# Patient Record
Sex: FEMALE | Race: WHITE | NOT HISPANIC OR LATINO | Employment: FULL TIME | URBAN - METROPOLITAN AREA
[De-identification: names, ages, dates, MRNs, and addresses within clinical notes are randomized per-mention and may not be internally consistent; named-entity substitution may affect disease eponyms.]

---

## 2017-02-20 RX ORDER — ATORVASTATIN CALCIUM 20 MG/1
TABLET, FILM COATED ORAL
Qty: 30 TABLET | Refills: 5 | Status: SHIPPED | OUTPATIENT
Start: 2017-02-20 | End: 2017-09-07 | Stop reason: SDUPTHER

## 2017-03-20 RX ORDER — LOSARTAN POTASSIUM 100 MG/1
100 TABLET ORAL DAILY
Qty: 90 TABLET | Refills: 1 | Status: SHIPPED | OUTPATIENT
Start: 2017-03-20 | End: 2017-07-10

## 2017-07-10 RX ORDER — LEVOTHYROXINE SODIUM 0.1 MG/1
TABLET ORAL
Qty: 90 TABLET | Refills: 1 | Status: SHIPPED | OUTPATIENT
Start: 2017-07-10 | End: 2018-01-21 | Stop reason: SDUPTHER

## 2017-07-10 RX ORDER — LOSARTAN POTASSIUM 100 MG/1
TABLET ORAL
Qty: 90 TABLET | Refills: 1 | Status: SHIPPED | OUTPATIENT
Start: 2017-07-10 | End: 2018-01-15 | Stop reason: SDUPTHER

## 2017-09-05 RX ORDER — LORATADINE, PSEUDOEPHEDRINE 5; 120 MG/1; MG/1
TABLET, EXTENDED RELEASE ORAL
Qty: 80 TABLET | Refills: 2 | Status: SHIPPED | OUTPATIENT
Start: 2017-09-05 | End: 2018-05-14 | Stop reason: SDUPTHER

## 2017-09-07 RX ORDER — ATORVASTATIN CALCIUM 20 MG/1
TABLET, FILM COATED ORAL
Qty: 30 TABLET | Refills: 5 | Status: SHIPPED | OUTPATIENT
Start: 2017-09-07 | End: 2018-12-18 | Stop reason: SDUPTHER

## 2017-09-28 ENCOUNTER — RESULTS ENCOUNTER (OUTPATIENT)
Dept: INTERNAL MEDICINE | Facility: CLINIC | Age: 62
End: 2017-09-28

## 2017-09-28 DIAGNOSIS — R73.09 ELEVATED GLUCOSE: ICD-10-CM

## 2017-09-28 DIAGNOSIS — I10 ESSENTIAL HYPERTENSION: ICD-10-CM

## 2017-09-28 DIAGNOSIS — E03.9 ACQUIRED HYPOTHYROIDISM: ICD-10-CM

## 2017-10-03 LAB
ALBUMIN SERPL-MCNC: 4.1 G/DL (ref 3.5–5.2)
ALBUMIN/GLOB SERPL: 1.4 G/DL
ALP SERPL-CCNC: 94 U/L (ref 39–117)
ALT SERPL-CCNC: 45 U/L (ref 1–33)
AST SERPL-CCNC: 43 U/L (ref 1–32)
BILIRUB SERPL-MCNC: 0.4 MG/DL (ref 0.1–1.2)
BUN SERPL-MCNC: 14 MG/DL (ref 8–23)
BUN/CREAT SERPL: 16.9 (ref 7–25)
CALCIUM SERPL-MCNC: 9.9 MG/DL (ref 8.6–10.5)
CHLORIDE SERPL-SCNC: 98 MMOL/L (ref 98–107)
CHOLEST SERPL-MCNC: 170 MG/DL (ref 0–200)
CO2 SERPL-SCNC: 23.7 MMOL/L (ref 22–29)
CREAT SERPL-MCNC: 0.83 MG/DL (ref 0.57–1)
GLOBULIN SER CALC-MCNC: 2.9 GM/DL
GLUCOSE SERPL-MCNC: 158 MG/DL (ref 65–99)
HBA1C MFR BLD: 7.9 % (ref 4.8–5.6)
HCV AB S/CO SERPL IA: <0.1 S/CO RATIO (ref 0–0.9)
HDLC SERPL-MCNC: 43 MG/DL (ref 40–60)
LDLC SERPL CALC-MCNC: 102 MG/DL (ref 0–100)
LDLC/HDLC SERPL: 2.38 {RATIO}
POTASSIUM SERPL-SCNC: 4.5 MMOL/L (ref 3.5–5.2)
PROT SERPL-MCNC: 7 G/DL (ref 6–8.5)
SODIUM SERPL-SCNC: 139 MMOL/L (ref 136–145)
TRIGL SERPL-MCNC: 123 MG/DL (ref 0–150)
TSH SERPL DL<=0.005 MIU/L-ACNC: 2.74 MIU/ML (ref 0.27–4.2)
VLDLC SERPL CALC-MCNC: 24.6 MG/DL (ref 5–40)

## 2017-10-05 ENCOUNTER — OFFICE VISIT (OUTPATIENT)
Dept: INTERNAL MEDICINE | Facility: CLINIC | Age: 62
End: 2017-10-05

## 2017-10-05 VITALS
SYSTOLIC BLOOD PRESSURE: 150 MMHG | HEIGHT: 62 IN | BODY MASS INDEX: 53.92 KG/M2 | HEART RATE: 84 BPM | DIASTOLIC BLOOD PRESSURE: 78 MMHG | WEIGHT: 293 LBS | OXYGEN SATURATION: 97 %

## 2017-10-05 DIAGNOSIS — E11.9 TYPE 2 DIABETES MELLITUS WITHOUT COMPLICATION, WITHOUT LONG-TERM CURRENT USE OF INSULIN (HCC): Primary | ICD-10-CM

## 2017-10-05 DIAGNOSIS — Z00.00 HEALTH CARE MAINTENANCE: ICD-10-CM

## 2017-10-05 DIAGNOSIS — R30.0 DYSURIA: ICD-10-CM

## 2017-10-05 LAB
BILIRUB BLD-MCNC: ABNORMAL MG/DL
CLARITY, POC: CLEAR
COLOR UR: YELLOW
GLUCOSE UR STRIP-MCNC: NEGATIVE MG/DL
KETONES UR QL: ABNORMAL
LEUKOCYTE EST, POC: NEGATIVE
NITRITE UR-MCNC: NEGATIVE MG/ML
PH UR: 7.5 [PH] (ref 5–8)
PROT UR STRIP-MCNC: ABNORMAL MG/DL
RBC # UR STRIP: NEGATIVE /UL
SP GR UR: 1.02 (ref 1–1.03)
UROBILINOGEN UR QL: NORMAL

## 2017-10-05 PROCEDURE — 81003 URINALYSIS AUTO W/O SCOPE: CPT | Performed by: INTERNAL MEDICINE

## 2017-10-05 PROCEDURE — 99396 PREV VISIT EST AGE 40-64: CPT | Performed by: INTERNAL MEDICINE

## 2017-10-05 RX ORDER — CHLORAL HYDRATE 500 MG
1000 CAPSULE ORAL
COMMUNITY

## 2017-10-05 RX ORDER — ESOMEPRAZOLE MAGNESIUM 20 MG/1
1 TABLET, DELAYED RELEASE ORAL DAILY
COMMUNITY
End: 2019-11-07 | Stop reason: CLARIF

## 2017-10-05 NOTE — PROGRESS NOTES
"Subjective   Evy Talley is a 62 y.o. female and is here for a comprehensive physical exam. The patient reports problems - weight gain.  She has a FH of DM  She has been thirsty a lot  Pt has been compliant with meds for GERD.  No sx as long as pt takes medicine as prescribed.  No epigastric pain or reflux sx    Pt is UTD with annual gyn exam and mammo pap next year    Do you take any herbs or supplements that were not prescribed by a doctor?       Social History: started weight watchers and walking reg    Social History     Social History   • Marital status: Single     Spouse name: N/A   • Number of children: 3   • Years of education: N/A     Occupational History   • legal nurse consultant      Social History Main Topics   • Smoking status: Former Smoker   • Smokeless tobacco: Not on file   • Alcohol use No   • Drug use: No   • Sexual activity: Not on file     Other Topics Concern   • Not on file     Social History Narrative       Family History:   Family History   Problem Relation Age of Onset   • Transient ischemic attack Mother    • Lung cancer Father        Past Medical History:   Past Medical History:   Diagnosis Date   • Elevated WBC count    • Essential hypertension    • GERD (gastroesophageal reflux disease)    • Hyperlipemia    • Hypothyroidism    • Lower urinary tract infection            Review of Systems    A comprehensive review of systems was negative.    Objective   /78 (BP Location: Left arm, Patient Position: Sitting)  Pulse 84  Ht 62\" (157.5 cm)  Wt 295 lb 1.6 oz (134 kg)  SpO2 97%  BMI 53.97 kg/m2    General Appearance:    Alert, cooperative, no distress, appears stated age   Head:    Normocephalic, without obvious abnormality, atraumatic   Eyes:    PERRL, conjunctiva/corneas clear, EOM's intact, fundi     benign, both eyes   Ears:    Normal TM's and external ear canals, both ears   Nose:   Nares normal, septum midline, mucosa normal, no drainage    or sinus tenderness   Throat:   " Lips, mucosa, and tongue normal; teeth and gums normal   Neck:   Supple, symmetrical, trachea midline, no adenopathy;     thyroid:  no enlargement/tenderness/nodules; no carotid    bruit or JVD   Back:     Symmetric, no curvature, ROM normal, no CVA tenderness   Lungs:     Clear to auscultation bilaterally, respirations unlabored   Chest Wall:    No tenderness or deformity    Heart:    Regular rate and rhythm, S1 and S2 normal, no murmur, rub   or gallop       Abdomen:     Soft, non-tender, bowel sounds active all four quadrants,     no masses, no organomegaly           Extremities:   Extremities normal, atraumatic, no cyanosis or edema   Pulses:   2+ and symmetric all extremities   Skin:   Skin color, texture, turgor normal, no rashes or lesions   Lymph nodes:   Cervical, supraclavicular, and axillary nodes normal   Neurologic:   CNII-XII intact, normal strength, sensation and reflexes     throughout         Medications:   Current Outpatient Prescriptions:   •  aspirin 81 MG tablet, Take 1 tablet by mouth daily., Disp: , Rfl:   •  atorvastatin (LIPITOR) 20 MG tablet, TAKE 1 TABLET BY MOUTH AT BEDTIME , Disp: 30 tablet, Rfl: 5  •  B Complex Vitamins (VITAMIN-B COMPLEX PO), Take 1 tablet by mouth daily., Disp: , Rfl:   •  Biotin (BIOTIN MAXIMUM STRENGTH) 10 MG tablet, Take 1 capsule by mouth daily., Disp: , Rfl:   •  Calcium Carb-Cholecalciferol (CALCIUM 600+D3 PO), Take 1 tablet by mouth daily., Disp: , Rfl:   •  cyclobenzaprine (FLEXERIL) 10 MG tablet, Take 1 tablet by mouth 2 (two) times a day as needed., Disp: , Rfl:   •  Esomeprazole Magnesium (NEXIUM 24HR) 20 MG tablet delayed-release, Take 1 capsule by mouth Daily., Disp: , Rfl:   •  KLS ALLERCLEAR D-12HR 5-120 MG per 12 hr tablet, TAKE 1 TABLET BY MOUTH DAILY., Disp: 80 tablet, Rfl: 2  •  levothyroxine (SYNTHROID, LEVOTHROID) 100 MCG tablet, TAKE ONE TABLET BY MOUTH ONCE DAILY, Disp: 90 tablet, Rfl: 1  •  losartan (COZAAR) 100 MG tablet, TAKE ONE TABLET BY  MOUTH ONCE DAILY, Disp: 90 tablet, Rfl: 1  •  Multiple Vitamin (MULTI VITAMIN DAILY PO), Take 1 tablet by mouth daily., Disp: , Rfl:   •  Omega-3 Fatty Acids (FISH OIL) 1000 MG capsule capsule, Take 1,000 mg by mouth Daily With Breakfast., Disp: , Rfl:   •  sertraline (ZOLOFT) 100 MG tablet, Take 1 tablet by mouth daily., Disp: , Rfl:   •  vitamin B-12 (CYANOCOBALAMIN) 1000 MCG tablet, Take 1,000 mcg by mouth daily., Disp: , Rfl:        Assessment/Plan   Healthy female exam.      1. Healthcare Maintenance:  2. Patient Counseling:  --Nutrition: Stressed importance of moderation in sodium/caffeine intake, saturated fat and cholesterol, caloric balance, sufficient intake of fresh fruits, vegetables, fiber, calcium and vit D  --Exercise: SHe is getting back to exercise  --Substance Abuse: no tob no etoh  --Dental health: she does go to the dentist  --Immunizations reviewed.  --Discussed benefits of screening colonoscopy.  3. DM-try metformin and farxiga  She will fu in 3 months and work on diet and exercise  4. HTN- doing well with losartan  5. HPL-ok with atorvastatin

## 2017-10-07 LAB
BACTERIA UR CULT: NORMAL
BACTERIA UR CULT: NORMAL

## 2017-11-02 ENCOUNTER — TELEPHONE (OUTPATIENT)
Dept: INTERNAL MEDICINE | Facility: CLINIC | Age: 62
End: 2017-11-02

## 2017-11-02 NOTE — TELEPHONE ENCOUNTER
----- Message from Lora Stark sent at 11/2/2017  1:21 PM EDT -----  Pt [facility]:  Dapagliflozin Propanediol (FARXIGA) 10 MG tablet    RX SENT TO PHARMACY

## 2017-12-29 ENCOUNTER — HOSPITAL ENCOUNTER (OUTPATIENT)
Dept: MAMMOGRAPHY | Facility: HOSPITAL | Age: 62
Discharge: HOME | End: 2017-12-29
Admitting: INTERNAL MEDICINE

## 2017-12-29 DIAGNOSIS — Z00.00 HEALTH CARE MAINTENANCE: ICD-10-CM

## 2017-12-29 DIAGNOSIS — R92.8 ABNORMAL MAMMOGRAM: Primary | ICD-10-CM

## 2017-12-29 PROCEDURE — G0202 SCR MAMMO BI INCL CAD: HCPCS

## 2018-01-05 ENCOUNTER — RESULTS ENCOUNTER (OUTPATIENT)
Dept: INTERNAL MEDICINE | Facility: CLINIC | Age: 63
End: 2018-01-05

## 2018-01-05 DIAGNOSIS — E11.9 TYPE 2 DIABETES MELLITUS WITHOUT COMPLICATION, WITHOUT LONG-TERM CURRENT USE OF INSULIN (HCC): ICD-10-CM

## 2018-01-15 RX ORDER — LOSARTAN POTASSIUM 100 MG/1
TABLET ORAL
Qty: 90 TABLET | Refills: 1 | Status: SHIPPED | OUTPATIENT
Start: 2018-01-15 | End: 2018-07-18 | Stop reason: SDUPTHER

## 2018-01-16 ENCOUNTER — OFFICE VISIT (OUTPATIENT)
Dept: INTERNAL MEDICINE | Facility: CLINIC | Age: 63
End: 2018-01-16

## 2018-01-16 VITALS
DIASTOLIC BLOOD PRESSURE: 68 MMHG | BODY MASS INDEX: 51.4 KG/M2 | WEIGHT: 279.31 LBS | OXYGEN SATURATION: 98 % | HEART RATE: 75 BPM | HEIGHT: 62 IN | SYSTOLIC BLOOD PRESSURE: 126 MMHG

## 2018-01-16 DIAGNOSIS — E78.5 HYPERLIPIDEMIA, UNSPECIFIED HYPERLIPIDEMIA TYPE: ICD-10-CM

## 2018-01-16 DIAGNOSIS — R73.09 ELEVATED GLUCOSE: ICD-10-CM

## 2018-01-16 DIAGNOSIS — E03.9 ACQUIRED HYPOTHYROIDISM: ICD-10-CM

## 2018-01-16 DIAGNOSIS — Z00.00 HEALTH CARE MAINTENANCE: Primary | ICD-10-CM

## 2018-01-16 DIAGNOSIS — I10 ESSENTIAL HYPERTENSION: ICD-10-CM

## 2018-01-16 PROCEDURE — 99396 PREV VISIT EST AGE 40-64: CPT | Performed by: INTERNAL MEDICINE

## 2018-01-16 NOTE — PROGRESS NOTES
"Subjective   Evy Talley is a 62 y.o. female and is here for a comprehensive physical exam. The patient reports problems - dm.  Pt has been taking BP meds as prescribed without any problems.  No HA  No episodes of orthostasis  Pt has been taking cholesterol meds as prescribed.  No difficulties with myalgias.   She has been on farxiga and metformin but she noticed some flank pain when she takes it but she likes the med as she says she has lost weight    Pt is UTD with annual gyn exam and mammo     Do you take any herbs or supplements that were not prescribed by a doctor?     Social History: she has been eating better  No reg exercise      Social History     Social History   • Marital status: Single     Spouse name: N/A   • Number of children: 3   • Years of education: N/A     Occupational History   • legal nurse consultant      Social History Main Topics   • Smoking status: Former Smoker   • Smokeless tobacco: Never Used   • Alcohol use No   • Drug use: No   • Sexual activity: Not on file     Other Topics Concern   • Not on file     Social History Narrative   • No narrative on file       Family History:   Family History   Problem Relation Age of Onset   • Transient ischemic attack Mother    • Lung cancer Father        Past Medical History:   Past Medical History:   Diagnosis Date   • Elevated WBC count    • Essential hypertension    • GERD (gastroesophageal reflux disease)    • Hyperlipemia    • Hypothyroidism    • Lower urinary tract infection    • 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    Objective   /68 (BP Location: Left arm, Patient Position: Sitting, Cuff Size: Large Adult)  Pulse 75  Ht 157.5 cm (62\")  Wt 127 kg (279 lb 5 oz)  SpO2 98%  BMI 51.09 kg/m2    General Appearance:    Alert, cooperative, no distress, appears stated age   Head:    Normocephalic, without obvious abnormality, atraumatic   Eyes:    PERRL, " conjunctiva/corneas clear, EOM's intact, fundi     benign, both eyes   Ears:    Normal TM's and external ear canals, both ears   Nose:   Nares normal, septum midline, mucosa normal, no drainage    or sinus tenderness   Throat:   Lips, mucosa, and tongue normal; teeth and gums normal   Neck:   Supple, symmetrical, trachea midline, no adenopathy;     thyroid:  no enlargement/tenderness/nodules; no carotid    bruit or JVD   Back:     Symmetric, no curvature, ROM normal, no CVA tenderness   Lungs:     Clear to auscultation bilaterally, respirations unlabored   Chest Wall:    No tenderness or deformity    Heart:    Regular rate and rhythm, S1 and S2 normal, no murmur, rub   or gallop       Abdomen:     Soft, non-tender, bowel sounds active all four quadrants,     no masses, no organomegaly           Extremities:   Extremities normal, atraumatic, no cyanosis or edema   Pulses:   2+ and symmetric all extremities   Skin:   Skin color, texture, turgor normal, no rashes or lesions   Lymph nodes:   Cervical, supraclavicular, and axillary nodes normal   Neurologic:   CNII-XII intact, normal strength, sensation and reflexes     throughout       Medications:   Current Outpatient Prescriptions:   •  aspirin 81 MG tablet, Take 1 tablet by mouth daily., Disp: , Rfl:   •  atorvastatin (LIPITOR) 20 MG tablet, TAKE 1 TABLET BY MOUTH AT BEDTIME , Disp: 30 tablet, Rfl: 5  •  B Complex Vitamins (VITAMIN-B COMPLEX PO), Take 1 tablet by mouth daily., Disp: , Rfl:   •  Biotin (BIOTIN MAXIMUM STRENGTH) 10 MG tablet, Take 1 capsule by mouth daily., Disp: , Rfl:   •  Calcium Carb-Cholecalciferol (CALCIUM 600+D3 PO), Take 1 tablet by mouth daily., Disp: , Rfl:   •  cyclobenzaprine (FLEXERIL) 10 MG tablet, Take 1 tablet by mouth 2 (two) times a day as needed., Disp: , Rfl:   •  Dapagliflozin Propanediol (FARXIGA) 10 MG tablet, Take 1 tablet by mouth Daily., Disp: 30 tablet, Rfl: 5  •  Esomeprazole Magnesium (NEXIUM 24HR) 20 MG tablet  delayed-release, Take 1 capsule by mouth Daily., Disp: , Rfl:   •  KLS ALLERCLEAR D-12HR 5-120 MG per 12 hr tablet, TAKE 1 TABLET BY MOUTH DAILY., Disp: 80 tablet, Rfl: 2  •  levothyroxine (SYNTHROID, LEVOTHROID) 100 MCG tablet, TAKE ONE TABLET BY MOUTH ONCE DAILY, Disp: 90 tablet, Rfl: 1  •  losartan (COZAAR) 100 MG tablet, TAKE ONE TABLET BY MOUTH ONCE DAILY, Disp: 90 tablet, Rfl: 1  •  metFORMIN (GLUCOPHAGE) 500 MG tablet, Take 1 tablet by mouth 2 (Two) Times a Day With Meals., Disp: 60 tablet, Rfl: 2  •  Multiple Vitamin (MULTI VITAMIN DAILY PO), Take 1 tablet by mouth daily., Disp: , Rfl:   •  Omega-3 Fatty Acids (FISH OIL) 1000 MG capsule capsule, Take 1,000 mg by mouth Daily With Breakfast., Disp: , Rfl:   •  sertraline (ZOLOFT) 100 MG tablet, Take 1 tablet by mouth daily., Disp: , Rfl:   •  vitamin B-12 (CYANOCOBALAMIN) 1000 MCG tablet, Take 1,000 mcg by mouth daily., Disp: , Rfl:        Assessment/Plan   Healthy female exam.      1. Healthcare Maintenance:  2. Patient Counseling:  --Nutrition: Stressed importance of moderation in sodium/caffeine intake, saturated fat and cholesterol, caloric balance, sufficient intake of fresh fruits, vegetables, fiber, calcium and vit D  --Exercise- I have rec 30 minutes a day  --Substance Abuse: no tob no etoh  --Dental health: she does go to the OR  --Immunizations reviewed.  --Discussed benefits of screening colonoscopy.  3. HPL- ok with lipitor  4. HTN- ok with current meds  5. DM- ok with current meds but to SE we will try sid

## 2018-01-17 DIAGNOSIS — E11.9 TYPE 2 DIABETES MELLITUS WITHOUT COMPLICATION, WITHOUT LONG-TERM CURRENT USE OF INSULIN (HCC): Primary | ICD-10-CM

## 2018-01-17 LAB
ALBUMIN SERPL-MCNC: 4.5 G/DL (ref 3.5–5.2)
ALBUMIN/GLOB SERPL: 1.4 G/DL
ALP SERPL-CCNC: 98 U/L (ref 39–117)
ALT SERPL-CCNC: 35 U/L (ref 1–33)
AST SERPL-CCNC: 32 U/L (ref 1–32)
BILIRUB SERPL-MCNC: 0.4 MG/DL (ref 0.1–1.2)
BUN SERPL-MCNC: 15 MG/DL (ref 8–23)
BUN/CREAT SERPL: 19.2 (ref 7–25)
CALCIUM SERPL-MCNC: 9.7 MG/DL (ref 8.6–10.5)
CHLORIDE SERPL-SCNC: 100 MMOL/L (ref 98–107)
CO2 SERPL-SCNC: 22.2 MMOL/L (ref 22–29)
CREAT SERPL-MCNC: 0.78 MG/DL (ref 0.57–1)
GLOBULIN SER CALC-MCNC: 3.3 GM/DL
GLUCOSE SERPL-MCNC: 102 MG/DL (ref 65–99)
HBA1C MFR BLD: 6.4 % (ref 4.8–5.6)
POTASSIUM SERPL-SCNC: 4.7 MMOL/L (ref 3.5–5.2)
PROT SERPL-MCNC: 7.8 G/DL (ref 6–8.5)
SODIUM SERPL-SCNC: 137 MMOL/L (ref 136–145)

## 2018-01-22 RX ORDER — LEVOTHYROXINE SODIUM 0.1 MG/1
TABLET ORAL
Qty: 90 TABLET | Refills: 1 | Status: SHIPPED | OUTPATIENT
Start: 2018-01-22 | End: 2018-06-21 | Stop reason: SDUPTHER

## 2018-02-22 ENCOUNTER — TELEPHONE (OUTPATIENT)
Dept: INTERNAL MEDICINE | Facility: CLINIC | Age: 63
End: 2018-02-22

## 2018-02-22 NOTE — TELEPHONE ENCOUNTER
VERBAL PER DR. WEAVER THAT PT WAS COMING TO P/U SOME JANUVIA SAMPLES, TAKE 1 DAILY. SAMPLES PLACED AT .

## 2018-03-30 ENCOUNTER — TELEPHONE (OUTPATIENT)
Dept: INTERNAL MEDICINE | Facility: CLINIC | Age: 63
End: 2018-03-30

## 2018-03-30 NOTE — TELEPHONE ENCOUNTER
SCHEDULING TRIED TO CONTACT PT TO SCHEDULE AMMO AND WAS UNSUCCESSFUL. I HAVE SENT THE ORDERS TO PTS ADDRESS AND ASKED HER TO CONTACT THE SCHEDULING DEPT.

## 2018-05-14 RX ORDER — LORATADINE, PSEUDOEPHEDRINE 5; 120 MG/1; MG/1
TABLET, EXTENDED RELEASE ORAL
Qty: 80 TABLET | Refills: 1 | Status: SHIPPED | OUTPATIENT
Start: 2018-05-14 | End: 2019-01-25

## 2018-06-18 RX ORDER — DAPAGLIFLOZIN 10 MG/1
TABLET, FILM COATED ORAL
Qty: 30 TABLET | Refills: 5 | Status: SHIPPED | OUTPATIENT
Start: 2018-06-18 | End: 2018-12-18 | Stop reason: SDUPTHER

## 2018-06-21 RX ORDER — LEVOTHYROXINE SODIUM 0.1 MG/1
100 TABLET ORAL DAILY
Qty: 90 TABLET | Refills: 1 | Status: SHIPPED | OUTPATIENT
Start: 2018-06-21 | End: 2019-03-07 | Stop reason: SDUPTHER

## 2018-07-16 ENCOUNTER — RESULTS ENCOUNTER (OUTPATIENT)
Dept: INTERNAL MEDICINE | Facility: CLINIC | Age: 63
End: 2018-07-16

## 2018-07-16 DIAGNOSIS — E03.9 ACQUIRED HYPOTHYROIDISM: ICD-10-CM

## 2018-07-16 DIAGNOSIS — R73.09 ELEVATED GLUCOSE: ICD-10-CM

## 2018-07-16 DIAGNOSIS — I10 ESSENTIAL HYPERTENSION: ICD-10-CM

## 2018-07-17 ENCOUNTER — RESULTS ENCOUNTER (OUTPATIENT)
Dept: INTERNAL MEDICINE | Facility: CLINIC | Age: 63
End: 2018-07-17

## 2018-07-17 DIAGNOSIS — E11.9 TYPE 2 DIABETES MELLITUS WITHOUT COMPLICATION, WITHOUT LONG-TERM CURRENT USE OF INSULIN (HCC): ICD-10-CM

## 2018-07-18 RX ORDER — LOSARTAN POTASSIUM 100 MG/1
TABLET ORAL
Qty: 90 TABLET | Refills: 0 | Status: SHIPPED | OUTPATIENT
Start: 2018-07-18 | End: 2018-10-18 | Stop reason: SDUPTHER

## 2018-07-19 RX ORDER — SERTRALINE HYDROCHLORIDE 100 MG/1
100 TABLET, FILM COATED ORAL DAILY
Qty: 90 TABLET | Refills: 0 | Status: SHIPPED | OUTPATIENT
Start: 2018-07-19 | End: 2018-10-18 | Stop reason: SDUPTHER

## 2018-07-23 ENCOUNTER — OFFICE VISIT (OUTPATIENT)
Dept: INTERNAL MEDICINE | Facility: CLINIC | Age: 63
End: 2018-07-23

## 2018-07-23 VITALS
OXYGEN SATURATION: 96 % | SYSTOLIC BLOOD PRESSURE: 134 MMHG | DIASTOLIC BLOOD PRESSURE: 76 MMHG | HEART RATE: 91 BPM | TEMPERATURE: 98.1 F

## 2018-07-23 DIAGNOSIS — E03.9 ACQUIRED HYPOTHYROIDISM: ICD-10-CM

## 2018-07-23 DIAGNOSIS — E78.5 HYPERLIPIDEMIA, UNSPECIFIED HYPERLIPIDEMIA TYPE: ICD-10-CM

## 2018-07-23 DIAGNOSIS — I10 ESSENTIAL HYPERTENSION: Primary | ICD-10-CM

## 2018-07-23 DIAGNOSIS — K21.9 GASTROESOPHAGEAL REFLUX DISEASE, ESOPHAGITIS PRESENCE NOT SPECIFIED: ICD-10-CM

## 2018-07-23 PROCEDURE — 99214 OFFICE O/P EST MOD 30 MIN: CPT | Performed by: INTERNAL MEDICINE

## 2018-07-23 RX ORDER — HYDROCODONE BITARTRATE AND ACETAMINOPHEN 7.5; 325 MG/1; MG/1
TABLET ORAL
Refills: 0 | COMMUNITY
Start: 2018-07-18 | End: 2019-01-25

## 2018-07-23 NOTE — PROGRESS NOTES
Subjective   Evy Talley is a 63 y.o. female here to follow up on DM, HTN & HPL.    History of Present Illness   She did fracture her right lower leg after a fall.  She is seeing ortho and they are just following on this for now  Pt has been taking BP meds as prescribed without any problems.  No HA  No episodes of orthostasis  Pt has been taking cholesterol meds as prescribed.  No difficulties with myalgias.   Pt has been compliant with diabetes meds. No side effects from medication No episodes of hypoglycemia. Patient denies any polyuria or polydipsia    The following portions of the patient's history were reviewed and updated as appropriate: allergies, current medications, past medical history, past social history and problem list.  Cannot exercise due to fxr      Review of Systems   All other systems reviewed and are negative.      Objective   Physical Exam   Constitutional: She is oriented to person, place, and time. She appears well-developed and well-nourished.   HENT:   Head: Normocephalic and atraumatic.   Right Ear: External ear normal.   Left Ear: External ear normal.   Mouth/Throat: Oropharynx is clear and moist.   Eyes: Pupils are equal, round, and reactive to light. Conjunctivae and EOM are normal.   Neck: Normal range of motion. No tracheal deviation present. No thyromegaly present.   Cardiovascular: Normal rate, regular rhythm, normal heart sounds and intact distal pulses.    Pulmonary/Chest: Effort normal and breath sounds normal.   Abdominal: Soft. Bowel sounds are normal. She exhibits no distension. There is no tenderness.   Musculoskeletal: Normal range of motion. She exhibits no edema or deformity.   Neurological: She is alert and oriented to person, place, and time.   Skin: Skin is warm and dry.   Psychiatric: She has a normal mood and affect. Her behavior is normal. Judgment and thought content normal.   Vitals reviewed.    Vitals:    07/23/18 1118   BP: 134/76   Pulse: 91   Temp: 98.1 °F  (36.7 °C)   SpO2: 96%     Current Outpatient Prescriptions:   •  aspirin 81 MG tablet, Take 1 tablet by mouth daily., Disp: , Rfl:   •  atorvastatin (LIPITOR) 20 MG tablet, TAKE 1 TABLET BY MOUTH AT BEDTIME , Disp: 30 tablet, Rfl: 5  •  B Complex Vitamins (VITAMIN-B COMPLEX PO), Take 1 tablet by mouth daily., Disp: , Rfl:   •  Biotin (BIOTIN MAXIMUM STRENGTH) 10 MG tablet, Take 1 capsule by mouth daily., Disp: , Rfl:   •  Calcium Carb-Cholecalciferol (CALCIUM 600+D3 PO), Take 1 tablet by mouth daily., Disp: , Rfl:   •  cyclobenzaprine (FLEXERIL) 10 MG tablet, Take 1 tablet by mouth 2 (two) times a day as needed., Disp: , Rfl:   •  Esomeprazole Magnesium (NEXIUM 24HR) 20 MG tablet delayed-release, Take 1 capsule by mouth Daily., Disp: , Rfl:   •  FARXIGA 10 MG tablet, TAKE ONE TABLET BY MOUTH ONCE DAILY, Disp: 30 tablet, Rfl: 5  •  HYDROcodone-acetaminophen (NORCO) 7.5-325 MG per tablet, TK 1-2 TS PO BID PRN, Disp: , Rfl: 0  •  KLS ALLERCLEAR D-12HR 5-120 MG per 12 hr tablet, TAKE ONE TABLET BY MOUTH ONE TIME DAILY , Disp: 80 tablet, Rfl: 1  •  levothyroxine (SYNTHROID, LEVOTHROID) 100 MCG tablet, Take 1 tablet by mouth Daily., Disp: 90 tablet, Rfl: 1  •  losartan (COZAAR) 100 MG tablet, TAKE ONE TABLET BY MOUTH ONCE DAILY, Disp: 90 tablet, Rfl: 0  •  metFORMIN (GLUCOPHAGE) 500 MG tablet, TAKE ONE TABLET BY MOUTH TWICE DAILY WITH MEALS, Disp: 180 tablet, Rfl: 1  •  Multiple Vitamin (MULTI VITAMIN DAILY PO), Take 1 tablet by mouth daily., Disp: , Rfl:   •  Omega-3 Fatty Acids (FISH OIL) 1000 MG capsule capsule, Take 1,000 mg by mouth Daily With Breakfast., Disp: , Rfl:   •  sertraline (ZOLOFT) 100 MG tablet, Take 1 tablet by mouth Daily., Disp: 90 tablet, Rfl: 0  •  vitamin B-12 (CYANOCOBALAMIN) 1000 MCG tablet, Take 1,000 mcg by mouth daily., Disp: , Rfl:   •  SITagliptin (JANUVIA) 100 MG tablet, Take 1 tablet by mouth Daily., Disp: 40 tablet, Rfl: 0      Assessment/Plan   Diagnoses and all orders for this  visit:    Essential hypertension    Hyperlipidemia, unspecified hyperlipidemia type    Gastroesophageal reflux disease, esophagitis presence not specified    Acquired hypothyroidism      1.  HTN-She is doing well  2. HPL-She is doing well atorvastatin  3. GERD- She is doing well with nexium  4. Hypothyroidism- We will cont hypothyroidism

## 2018-07-24 LAB
ALBUMIN SERPL-MCNC: 4.4 G/DL (ref 3.5–5.2)
ALBUMIN/GLOB SERPL: 1.6 G/DL
ALP SERPL-CCNC: 96 U/L (ref 39–117)
ALT SERPL-CCNC: 33 U/L (ref 1–33)
AST SERPL-CCNC: 23 U/L (ref 1–32)
BILIRUB SERPL-MCNC: 0.4 MG/DL (ref 0.1–1.2)
BUN SERPL-MCNC: 13 MG/DL (ref 8–23)
BUN/CREAT SERPL: 14.4 (ref 7–25)
CALCIUM SERPL-MCNC: 9.6 MG/DL (ref 8.6–10.5)
CHLORIDE SERPL-SCNC: 100 MMOL/L (ref 98–107)
CHOLEST SERPL-MCNC: 178 MG/DL (ref 0–200)
CO2 SERPL-SCNC: 21.1 MMOL/L (ref 22–29)
CREAT SERPL-MCNC: 0.9 MG/DL (ref 0.57–1)
GLOBULIN SER CALC-MCNC: 2.8 GM/DL
GLUCOSE SERPL-MCNC: 82 MG/DL (ref 65–99)
HBA1C MFR BLD: 6.1 % (ref 4.8–5.6)
HDLC SERPL-MCNC: 56 MG/DL (ref 40–60)
LDLC SERPL CALC-MCNC: 98 MG/DL (ref 0–100)
LDLC/HDLC SERPL: 1.76 {RATIO}
POTASSIUM SERPL-SCNC: 4.6 MMOL/L (ref 3.5–5.2)
PROT SERPL-MCNC: 7.2 G/DL (ref 6–8.5)
SODIUM SERPL-SCNC: 139 MMOL/L (ref 136–145)
TRIGL SERPL-MCNC: 118 MG/DL (ref 0–150)
TSH SERPL DL<=0.005 MIU/L-ACNC: 4.06 MIU/ML (ref 0.27–4.2)
VLDLC SERPL CALC-MCNC: 23.6 MG/DL (ref 5–40)

## 2018-09-17 RX ORDER — LEVOTHYROXINE SODIUM 0.1 MG/1
TABLET ORAL
Qty: 90 TABLET | Refills: 1 | Status: SHIPPED | OUTPATIENT
Start: 2018-09-17 | End: 2019-01-25

## 2018-10-18 RX ORDER — SERTRALINE HYDROCHLORIDE 100 MG/1
TABLET, FILM COATED ORAL
Qty: 90 TABLET | Refills: 1 | Status: SHIPPED | OUTPATIENT
Start: 2018-10-18 | End: 2019-01-25

## 2018-10-18 RX ORDER — LOSARTAN POTASSIUM 100 MG/1
TABLET ORAL
Qty: 90 TABLET | Refills: 1 | Status: SHIPPED | OUTPATIENT
Start: 2018-10-18 | End: 2019-01-25

## 2018-12-18 RX ORDER — DAPAGLIFLOZIN 10 MG/1
TABLET, FILM COATED ORAL
Qty: 30 TABLET | Refills: 5 | Status: SHIPPED | OUTPATIENT
Start: 2018-12-18 | End: 2019-01-25

## 2018-12-18 RX ORDER — ATORVASTATIN CALCIUM 20 MG/1
TABLET, FILM COATED ORAL
Qty: 90 TABLET | Refills: 0 | Status: SHIPPED | OUTPATIENT
Start: 2018-12-18 | End: 2019-01-25

## 2019-01-23 DIAGNOSIS — I10 ESSENTIAL HYPERTENSION: ICD-10-CM

## 2019-01-23 DIAGNOSIS — E78.5 HYPERLIPIDEMIA, UNSPECIFIED HYPERLIPIDEMIA TYPE: Primary | ICD-10-CM

## 2019-01-23 DIAGNOSIS — E11.9 TYPE 2 DIABETES MELLITUS WITHOUT COMPLICATION, WITHOUT LONG-TERM CURRENT USE OF INSULIN (HCC): ICD-10-CM

## 2019-01-23 DIAGNOSIS — E03.9 ACQUIRED HYPOTHYROIDISM: ICD-10-CM

## 2019-01-24 ENCOUNTER — APPOINTMENT (OUTPATIENT)
Dept: PREADMISSION TESTING | Facility: HOSPITAL | Age: 64
End: 2019-01-24

## 2019-01-24 LAB
ALBUMIN SERPL-MCNC: 4.1 G/DL (ref 3.5–5.2)
ALBUMIN/GLOB SERPL: 1.3 G/DL
ALP SERPL-CCNC: 97 U/L (ref 39–117)
ALT SERPL-CCNC: 24 U/L (ref 1–33)
AST SERPL-CCNC: 20 U/L (ref 1–32)
BILIRUB SERPL-MCNC: 0.2 MG/DL (ref 0.1–1.2)
BUN SERPL-MCNC: 12 MG/DL (ref 8–23)
BUN/CREAT SERPL: 15.2 (ref 7–25)
CALCIUM SERPL-MCNC: 9.5 MG/DL (ref 8.6–10.5)
CHLORIDE SERPL-SCNC: 101 MMOL/L (ref 98–107)
CHOLEST SERPL-MCNC: 183 MG/DL (ref 0–200)
CO2 SERPL-SCNC: 21.9 MMOL/L (ref 22–29)
CREAT SERPL-MCNC: 0.79 MG/DL (ref 0.57–1)
GLOBULIN SER CALC-MCNC: 3.1 GM/DL
GLUCOSE SERPL-MCNC: 114 MG/DL (ref 65–99)
HBA1C MFR BLD: 6.47 % (ref 4.8–5.6)
HDLC SERPL-MCNC: 52 MG/DL (ref 40–60)
LDLC SERPL CALC-MCNC: 103 MG/DL (ref 0–100)
LDLC/HDLC SERPL: 1.98 {RATIO}
POTASSIUM SERPL-SCNC: 4.8 MMOL/L (ref 3.5–5.2)
PROT SERPL-MCNC: 7.2 G/DL (ref 6–8.5)
SODIUM SERPL-SCNC: 139 MMOL/L (ref 136–145)
T4 FREE SERPL-MCNC: 1.33 NG/DL (ref 0.93–1.7)
TRIGL SERPL-MCNC: 140 MG/DL (ref 0–150)
TSH SERPL DL<=0.005 MIU/L-ACNC: 5.31 MIU/ML (ref 0.27–4.2)
VLDLC SERPL CALC-MCNC: 28 MG/DL (ref 5–40)

## 2019-01-25 ENCOUNTER — APPOINTMENT (OUTPATIENT)
Dept: PREADMISSION TESTING | Facility: HOSPITAL | Age: 64
End: 2019-01-25

## 2019-01-25 VITALS
HEART RATE: 77 BPM | TEMPERATURE: 98 F | HEIGHT: 62 IN | RESPIRATION RATE: 16 BRPM | SYSTOLIC BLOOD PRESSURE: 147 MMHG | WEIGHT: 285.1 LBS | BODY MASS INDEX: 52.46 KG/M2 | OXYGEN SATURATION: 97 % | DIASTOLIC BLOOD PRESSURE: 78 MMHG

## 2019-01-25 LAB
ALBUMIN SERPL-MCNC: 4 G/DL (ref 3.5–5.2)
ALBUMIN/GLOB SERPL: 1.1 G/DL
ALP SERPL-CCNC: 92 U/L (ref 39–117)
ALT SERPL W P-5'-P-CCNC: 24 U/L (ref 1–33)
ANION GAP SERPL CALCULATED.3IONS-SCNC: 15.6 MMOL/L
ANISOCYTOSIS BLD QL: NORMAL
AST SERPL-CCNC: 19 U/L (ref 1–32)
BASOPHILS # BLD AUTO: 0.17 10*3/MM3 (ref 0–0.2)
BASOPHILS NFR BLD AUTO: 1.2 % (ref 0–1.5)
BILIRUB SERPL-MCNC: 0.3 MG/DL (ref 0.1–1.2)
BUN BLD-MCNC: 14 MG/DL (ref 8–23)
BUN/CREAT SERPL: 16.3 (ref 7–25)
CALCIUM SPEC-SCNC: 9.6 MG/DL (ref 8.6–10.5)
CHLORIDE SERPL-SCNC: 103 MMOL/L (ref 98–107)
CO2 SERPL-SCNC: 20.4 MMOL/L (ref 22–29)
CREAT BLD-MCNC: 0.86 MG/DL (ref 0.57–1)
DACRYOCYTES BLD QL SMEAR: NORMAL
DEPRECATED RDW RBC AUTO: 50.8 FL (ref 37–54)
EOSINOPHIL # BLD AUTO: 0.3 10*3/MM3 (ref 0–0.7)
EOSINOPHIL NFR BLD AUTO: 2.1 % (ref 0.3–6.2)
ERYTHROCYTE [DISTWIDTH] IN BLOOD BY AUTOMATED COUNT: 18.6 % (ref 11.7–13)
GFR SERPL CREATININE-BSD FRML MDRD: 66 ML/MIN/1.73
GLOBULIN UR ELPH-MCNC: 3.6 GM/DL
GLUCOSE BLD-MCNC: 98 MG/DL (ref 65–99)
HCT VFR BLD AUTO: 39.4 % (ref 35.6–45.5)
HGB BLD-MCNC: 12 G/DL (ref 11.9–15.5)
HYPOCHROMIA BLD QL: NORMAL
IMM GRANULOCYTES # BLD AUTO: 0.03 10*3/MM3 (ref 0–0.03)
IMM GRANULOCYTES NFR BLD AUTO: 0.2 % (ref 0–0.5)
LYMPHOCYTES # BLD AUTO: 4.31 10*3/MM3 (ref 0.9–4.8)
LYMPHOCYTES NFR BLD AUTO: 30.2 % (ref 19.6–45.3)
MCH RBC QN AUTO: 22.8 PG (ref 26.9–32)
MCHC RBC AUTO-ENTMCNC: 30.5 G/DL (ref 32.4–36.3)
MCV RBC AUTO: 74.9 FL (ref 80.5–98.2)
MONOCYTES # BLD AUTO: 0.86 10*3/MM3 (ref 0.2–1.2)
MONOCYTES NFR BLD AUTO: 6 % (ref 5–12)
NEUTROPHILS # BLD AUTO: 8.64 10*3/MM3 (ref 1.9–8.1)
NEUTROPHILS NFR BLD AUTO: 60.5 % (ref 42.7–76)
PLAT MORPH BLD: NORMAL
PLATELET # BLD AUTO: 524 10*3/MM3 (ref 140–500)
PMV BLD AUTO: 10.1 FL (ref 6–12)
POTASSIUM BLD-SCNC: 4.4 MMOL/L (ref 3.5–5.2)
PROT SERPL-MCNC: 7.6 G/DL (ref 6–8.5)
RBC # BLD AUTO: 5.26 10*6/MM3 (ref 3.9–5.2)
SODIUM BLD-SCNC: 139 MMOL/L (ref 136–145)
WBC MORPH BLD: NORMAL
WBC NRBC COR # BLD: 14.28 10*3/MM3 (ref 4.5–10.7)

## 2019-01-25 PROCEDURE — 93010 ELECTROCARDIOGRAM REPORT: CPT | Performed by: INTERNAL MEDICINE

## 2019-01-25 PROCEDURE — 80053 COMPREHEN METABOLIC PANEL: CPT | Performed by: ORTHOPAEDIC SURGERY

## 2019-01-25 PROCEDURE — 93005 ELECTROCARDIOGRAM TRACING: CPT

## 2019-01-25 PROCEDURE — 85025 COMPLETE CBC W/AUTO DIFF WBC: CPT | Performed by: ORTHOPAEDIC SURGERY

## 2019-01-25 PROCEDURE — 36415 COLL VENOUS BLD VENIPUNCTURE: CPT

## 2019-01-25 PROCEDURE — 85007 BL SMEAR W/DIFF WBC COUNT: CPT | Performed by: ORTHOPAEDIC SURGERY

## 2019-01-25 RX ORDER — SERTRALINE HYDROCHLORIDE 100 MG/1
100 TABLET, FILM COATED ORAL DAILY
COMMUNITY
End: 2019-12-23

## 2019-01-25 RX ORDER — LOSARTAN POTASSIUM 100 MG/1
100 TABLET ORAL EVERY EVENING
COMMUNITY
End: 2019-03-07 | Stop reason: SDUPTHER

## 2019-01-25 RX ORDER — ATORVASTATIN CALCIUM 10 MG/1
10 TABLET, FILM COATED ORAL DAILY
COMMUNITY
End: 2019-03-13 | Stop reason: ALTCHOICE

## 2019-01-25 NOTE — DISCHARGE INSTRUCTIONS
Take the following medications the morning of surgery with a small sip of water: NEXIUM    ARRIVAL TIME 12:30        General Instructions:  • Do not eat solid food after midnight the night before surgery.  • You may drink clear liquids day of surgery but must stop at least one hour before your hospital arrival time.  • It is beneficial for you to have a clear drink that contains carbohydrates the day of surgery.  We suggest a 12 to 20 ounce bottle of Gatorade or Powerade for non-diabetic patients or a 12 to 20 ounce bottle of G2 or Powerade Zero for diabetic patients. (Pediatric patients, are not advised to drink a 12 to 20 ounce carbohydrate drink)    Clear liquids are liquids you can see through.  Nothing red in color.     Plain water                               Sports drinks  Sodas                                   Gelatin (Jell-O)  Fruit juices without pulp such as white grape juice and apple juice  Popsicles that contain no fruit or yogurt  Tea or coffee (no cream or milk added)  Gatorade / Powerade  G2 / Powerade Zero    • Infants may have breast milk up to four hours before surgery.  • Infants drinking formula may drink formula up to six hours before surgery.   • Patients who avoid smoking, chewing tobacco and alcohol for 4 weeks prior to surgery have a reduced risk of post-operative complications.  Quit smoking as many days before surgery as you can.  • Do not smoke, use chewing tobacco or drink alcohol the day of surgery.   • Bring any papers given to you in the doctor’s office.  • Wear clean comfortable clothes and socks.  • Do not wear contact lenses or make-up.  Bring a case for your glasses.   • Remove all piercings.  Leave jewelry and any other valuables at home.  • Hair extensions with metal clips must be removed prior to surgery.  • The Pre-Admission Testing nurse will instruct you to bring medications if unable to obtain an accurate list in Pre-Admission Testing.            Preventing a Surgical  Site Infection:  • For 2 to 3 days before surgery, avoid shaving with a razor because the razor can irritate skin and make it easier to develop an infection.    • Any areas of open skin can increase the risk of a post-operative wound infection by allowing bacteria to enter and travel throughout the body.  Notify your surgeon if you have any skin wounds / rashes even if it is not near the expected surgical site.  The area will need assessed to determine if surgery should be delayed until it is healed.  • The night prior to surgery sleep in a clean bed with clean clothing.  Do not allow pets to sleep with you.  • Shower on the morning of surgery using a fresh bar of anti-bacterial soap (such as Dial) and clean washcloth.  Dry with a clean towel and dress in clean clothing.  • Ask your surgeon if you will be receiving antibiotics prior to surgery.  • Make sure you, your family, and all healthcare providers clean their hands with soap and water or an alcohol based hand  before caring for you or your wound.    Day of surgery:  Upon arrival, a Pre-op nurse and Anesthesiologist will review your health history, obtain vital signs, and answer questions you may have.  The only belongings needed at this time will be your home medications and if applicable your C-PAP/BI-PAP machine.  If you are staying overnight your family can leave the rest of your belongings in the car and bring them to your room later.  A Pre-op nurse will start an IV and you may receive medication in preparation for surgery, including something to help you relax.  Your family will be able to see you in the Pre-op area.  While you are in surgery your family should notify the waiting room  if they leave the waiting room area and provide a contact phone number.    Please be aware that surgery does come with discomfort.  We want to make every effort to control your discomfort so please discuss any uncontrolled symptoms with your nurse.    Your doctor will most likely have prescribed pain medications.      If you are going home after surgery you will receive individualized written care instructions before being discharged.  A responsible adult must drive you to and from the hospital on the day of your surgery and stay with you for 24 hours.    If you are staying overnight following surgery, you will be transported to your hospital room following the recovery period.  [facility] has all private rooms.    You have received a list of surgical assistants for your reference.  If you have any questions please call Pre-Admission Testing at [phone].  Deductibles and co-payments are collected on the day of service. Please be prepared to pay the required co-pay, deductible or deposit on the day of service as defined by your plan.

## 2019-01-29 ENCOUNTER — OFFICE VISIT (OUTPATIENT)
Dept: INTERNAL MEDICINE | Facility: CLINIC | Age: 64
End: 2019-01-29

## 2019-01-29 VITALS
DIASTOLIC BLOOD PRESSURE: 84 MMHG | WEIGHT: 282 LBS | SYSTOLIC BLOOD PRESSURE: 152 MMHG | BODY MASS INDEX: 51.89 KG/M2 | HEART RATE: 85 BPM | TEMPERATURE: 99.7 F | HEIGHT: 62 IN | OXYGEN SATURATION: 98 %

## 2019-01-29 DIAGNOSIS — E03.9 ACQUIRED HYPOTHYROIDISM: ICD-10-CM

## 2019-01-29 DIAGNOSIS — D72.829 LEUKOCYTOSIS, UNSPECIFIED TYPE: ICD-10-CM

## 2019-01-29 DIAGNOSIS — E78.5 HYPERLIPIDEMIA, UNSPECIFIED HYPERLIPIDEMIA TYPE: ICD-10-CM

## 2019-01-29 DIAGNOSIS — E11.9 TYPE 2 DIABETES MELLITUS WITHOUT COMPLICATION, WITHOUT LONG-TERM CURRENT USE OF INSULIN (HCC): ICD-10-CM

## 2019-01-29 DIAGNOSIS — Z00.00 HEALTH CARE MAINTENANCE: Primary | ICD-10-CM

## 2019-01-29 DIAGNOSIS — I10 ESSENTIAL HYPERTENSION: ICD-10-CM

## 2019-01-29 DIAGNOSIS — Z12.31 ENCOUNTER FOR SCREENING MAMMOGRAM FOR MALIGNANT NEOPLASM OF BREAST: ICD-10-CM

## 2019-01-29 PROCEDURE — 99397 PER PM REEVAL EST PAT 65+ YR: CPT | Performed by: INTERNAL MEDICINE

## 2019-01-29 RX ORDER — CEFAZOLIN SODIUM 2 G/100ML
2 INJECTION, SOLUTION INTRAVENOUS ONCE
Status: COMPLETED | OUTPATIENT
Start: 2019-01-30 | End: 2019-01-30

## 2019-01-29 NOTE — PROGRESS NOTES
Subjective   Evy Talley is a 63 y.o. female and is here for a comprehensive physical exam. The patient reports problems - torn rotator cuff .  S/p trauma 3 weeks ago   She is having sx tomorrow   Elevated white blood cell on and off.  It did turn to almost normal but now is elevated again.  No recent infection.    Pt is UTD with annual gyn exam and mammo she is due a mammogram.  She had a hysterectomy several years ago    Do you take any herbs or supplements that were not prescribed by a doctor?  List of vitamins      Social History: She has not been very good with diet and exercise lately  Social History     Socioeconomic History   • Marital status: Single     Spouse name: Not on file   • Number of children: 3   • Years of education: Not on file   • Highest education level: Not on file   Social Needs   • Financial resource strain: Not on file   • Food insecurity - worry: Not on file   • Food insecurity - inability: Not on file   • Transportation needs - medical: Not on file   • Transportation needs - non-medical: Not on file   Occupational History   • Occupation: legal nurse consultant   Tobacco Use   • Smoking status: Former Smoker     Years: 4.00     Last attempt to quit: 1976     Years since quittin.0   • Smokeless tobacco: Never Used   Substance and Sexual Activity   • Alcohol use: No   • Drug use: No   • Sexual activity: Not on file   Other Topics Concern   • Not on file   Social History Narrative   • Not on file       Family History:   Family History   Problem Relation Age of Onset   • Transient ischemic attack Mother    • Lung cancer Father    • Malig Hyperthermia Neg Hx        Past Medical History:   Past Medical History:   Diagnosis Date   • Arthritis    • Diabetes mellitus (CMS/HCC)    • Essential hypertension    • GERD (gastroesophageal reflux disease)    • Hx of renal calculi    • Hyperlipemia    • Mild depression (CMS/HCC)    • Ovarian cancer (CMS/HCC)     ovarian carcanoid   •  "Rotator cuff tear            Review of Systems    A comprehensive review of systems was negative.    Objective   /84 (BP Location: Right arm, Patient Position: Sitting, Cuff Size: Large Adult)   Pulse 85   Temp 99.7 °F (37.6 °C) (Oral)   Ht 157.5 cm (62\")   Wt 128 kg (282 lb)   SpO2 98%   BMI 51.58 kg/m²     General Appearance:    Alert, cooperative, no distress, appears stated age   Head:    Normocephalic, without obvious abnormality, atraumatic   Eyes:    PERRL, conjunctiva/corneas clear, EOM's intact, fundi     benign, both eyes   Ears:    Normal TM's and external ear canals, both ears   Nose:   Nares normal, septum midline, mucosa normal, no drainage    or sinus tenderness   Throat:   Lips, mucosa, and tongue normal; teeth and gums normal   Neck:   Supple, symmetrical, trachea midline, no adenopathy;     thyroid:  no enlargement/tenderness/nodules; no carotid    bruit or JVD   Back:     Symmetric, no curvature, ROM normal, no CVA tenderness   Lungs:     Clear to auscultation bilaterally, respirations unlabored   Chest Wall:    No tenderness or deformity    Heart:    Regular rate and rhythm, S1 and S2 normal, no murmur, rub   or gallop       Abdomen:     Soft, non-tender, bowel sounds active all four quadrants,     no masses, no organomegaly           Extremities:   Extremities normal, atraumatic, no cyanosis or edema   Pulses:   2+ and symmetric all extremities   Skin:   Skin color, texture, turgor normal, no rashes or lesions   Lymph nodes:   Cervical, supraclavicular, and axillary nodes normal   Neurologic:   CNII-XII intact, normal strength, sensation and reflexes     throughout       Medications:   Current Outpatient Medications:   •  atorvastatin (LIPITOR) 10 MG tablet, Take 10 mg by mouth Daily., Disp: , Rfl:   •  Dapagliflozin Propanediol (FARXIGA) 10 MG tablet, Take 1 tablet by mouth Every Morning., Disp: , Rfl:   •  Esomeprazole Magnesium (NEXIUM 24HR) 20 MG tablet delayed-release, Take 1 " capsule by mouth Daily., Disp: , Rfl:   •  levothyroxine (SYNTHROID, LEVOTHROID) 100 MCG tablet, Take 1 tablet by mouth Daily., Disp: 90 tablet, Rfl: 1  •  losartan (COZAAR) 100 MG tablet, Take 100 mg by mouth Every Evening., Disp: , Rfl:   •  metFORMIN (GLUCOPHAGE) 500 MG tablet, Take 500 mg by mouth 2 (Two) Times a Day With Meals., Disp: , Rfl:   •  Multiple Vitamin (MULTI VITAMIN DAILY PO), Take 1 tablet by mouth daily., Disp: , Rfl:   •  sertraline (ZOLOFT) 100 MG tablet, Take 100 mg by mouth Daily., Disp: , Rfl:   •  aspirin 81 MG tablet, Take 1 tablet by mouth Daily. PT TO STOP PER MD INSTRUCTION, Disp: , Rfl:   •  B Complex Vitamins (VITAMIN-B COMPLEX PO), Take 1 tablet by mouth daily., Disp: , Rfl:   •  Biotin (BIOTIN MAXIMUM STRENGTH) 10 MG tablet, Take 1 capsule by mouth daily., Disp: , Rfl:   •  Calcium Carb-Cholecalciferol (CALCIUM 600+D3 PO), Take 1 tablet by mouth daily., Disp: , Rfl:   •  cyclobenzaprine (FLEXERIL) 10 MG tablet, Take 1 tablet by mouth 2 (two) times a day as needed., Disp: , Rfl:   •  Omega-3 Fatty Acids (FISH OIL) 1000 MG capsule capsule, Take 1,000 mg by mouth Daily With Breakfast. PT TO STOP PER MD INSTRUCTION, Disp: , Rfl:   •  vitamin B-12 (CYANOCOBALAMIN) 1000 MCG tablet, Take 1,000 mcg by mouth daily., Disp: , Rfl:        Assessment/Plan   Healthy female exam.     1. Healthcare Maintenance:  2. Patient Counseling:  --Nutrition: Stressed importance of moderation in sodium/caffeine intake, saturated fat and cholesterol, caloric balance, sufficient intake of fresh fruits, vegetables, fiber, calcium and vit D  --Exercise: she is not exercising much  --Substance Abuse: no tob  No etoh  --Dental health: she does does go to the dentist reg  --Immunizations reviewed.  --Discussed benefits of screening colonoscopy.  3.  Torn rotator cuff- surgery tomorrow  4.  DM-  meds now not covered  5.  Elevated WBC  -s she needs to FU on this after sx  May need to see cbc

## 2019-01-30 ENCOUNTER — HOSPITAL ENCOUNTER (OUTPATIENT)
Facility: HOSPITAL | Age: 64
Setting detail: HOSPITAL OUTPATIENT SURGERY
Discharge: HOME | End: 2019-01-30
Attending: ORTHOPAEDIC SURGERY | Admitting: ORTHOPAEDIC SURGERY

## 2019-01-30 ENCOUNTER — ANESTHESIA EVENT (OUTPATIENT)
Dept: PERIOP | Facility: HOSPITAL | Age: 64
End: 2019-01-30

## 2019-01-30 ENCOUNTER — ANESTHESIA (OUTPATIENT)
Dept: PERIOP | Facility: HOSPITAL | Age: 64
End: 2019-01-30

## 2019-01-30 VITALS
SYSTOLIC BLOOD PRESSURE: 130 MMHG | HEART RATE: 81 BPM | TEMPERATURE: 98 F | DIASTOLIC BLOOD PRESSURE: 61 MMHG | RESPIRATION RATE: 16 BRPM | OXYGEN SATURATION: 96 %

## 2019-01-30 PROCEDURE — 25010000002 MIDAZOLAM PER 1 MG: Performed by: ANESTHESIOLOGY

## 2019-01-30 PROCEDURE — 25010000002 FENTANYL CITRATE (PF) 100 MCG/2ML SOLUTION: Performed by: ANESTHESIOLOGY

## 2019-01-30 PROCEDURE — 25010000002 DEXAMETHASONE PER 1 MG: Performed by: NURSE ANESTHETIST, CERTIFIED REGISTERED

## 2019-01-30 PROCEDURE — 25010000002 EPINEPHRINE PER 0.1 MG: Performed by: ORTHOPAEDIC SURGERY

## 2019-01-30 PROCEDURE — L3670 SO ACRO/CLAV CAN WEB PRE OTS: HCPCS | Performed by: ORTHOPAEDIC SURGERY

## 2019-01-30 PROCEDURE — 25010000002 FENTANYL CITRATE (PF) 100 MCG/2ML SOLUTION: Performed by: NURSE ANESTHETIST, CERTIFIED REGISTERED

## 2019-01-30 PROCEDURE — 25010000002 PHENYLEPHRINE PER 1 ML: Performed by: NURSE ANESTHETIST, CERTIFIED REGISTERED

## 2019-01-30 PROCEDURE — 25010000002 ONDANSETRON PER 1 MG: Performed by: NURSE ANESTHETIST, CERTIFIED REGISTERED

## 2019-01-30 PROCEDURE — 25010000002 PROPOFOL 10 MG/ML EMULSION: Performed by: NURSE ANESTHETIST, CERTIFIED REGISTERED

## 2019-01-30 PROCEDURE — 25010000002 ROPIVACAINE PER 1 MG: Performed by: ANESTHESIOLOGY

## 2019-01-30 PROCEDURE — 25010000003 CEFAZOLIN IN DEXTROSE 2-4 GM/100ML-% SOLUTION: Performed by: ORTHOPAEDIC SURGERY

## 2019-01-30 RX ORDER — FENTANYL CITRATE 50 UG/ML
50 INJECTION, SOLUTION INTRAMUSCULAR; INTRAVENOUS
Status: DISCONTINUED | OUTPATIENT
Start: 2019-01-30 | End: 2019-01-31 | Stop reason: HOSPADM

## 2019-01-30 RX ORDER — HYDRALAZINE HYDROCHLORIDE 20 MG/ML
5 INJECTION INTRAMUSCULAR; INTRAVENOUS
Status: DISCONTINUED | OUTPATIENT
Start: 2019-01-30 | End: 2019-01-31 | Stop reason: HOSPADM

## 2019-01-30 RX ORDER — PROMETHAZINE HYDROCHLORIDE 25 MG/ML
6.25 INJECTION, SOLUTION INTRAMUSCULAR; INTRAVENOUS ONCE AS NEEDED
Status: DISCONTINUED | OUTPATIENT
Start: 2019-01-30 | End: 2019-01-31 | Stop reason: HOSPADM

## 2019-01-30 RX ORDER — PROMETHAZINE HYDROCHLORIDE 25 MG/1
25 TABLET ORAL ONCE AS NEEDED
Status: DISCONTINUED | OUTPATIENT
Start: 2019-01-30 | End: 2019-01-31 | Stop reason: HOSPADM

## 2019-01-30 RX ORDER — EPHEDRINE SULFATE 50 MG/ML
INJECTION, SOLUTION INTRAVENOUS AS NEEDED
Status: DISCONTINUED | OUTPATIENT
Start: 2019-01-30 | End: 2019-01-30 | Stop reason: SURG

## 2019-01-30 RX ORDER — MIDAZOLAM HYDROCHLORIDE 1 MG/ML
1 INJECTION INTRAMUSCULAR; INTRAVENOUS
Status: DISCONTINUED | OUTPATIENT
Start: 2019-01-30 | End: 2019-01-30 | Stop reason: HOSPADM

## 2019-01-30 RX ORDER — NALOXONE HCL 0.4 MG/ML
0.4 VIAL (ML) INJECTION AS NEEDED
Status: DISCONTINUED | OUTPATIENT
Start: 2019-01-30 | End: 2019-01-31 | Stop reason: HOSPADM

## 2019-01-30 RX ORDER — NALBUPHINE HCL 10 MG/ML
10 AMPUL (ML) INJECTION EVERY 4 HOURS PRN
Status: DISCONTINUED | OUTPATIENT
Start: 2019-01-30 | End: 2019-01-31 | Stop reason: HOSPADM

## 2019-01-30 RX ORDER — DIPHENHYDRAMINE HYDROCHLORIDE 50 MG/ML
12.5 INJECTION INTRAMUSCULAR; INTRAVENOUS
Status: DISCONTINUED | OUTPATIENT
Start: 2019-01-30 | End: 2019-01-31 | Stop reason: HOSPADM

## 2019-01-30 RX ORDER — HYDROCODONE BITARTRATE AND ACETAMINOPHEN 5; 325 MG/1; MG/1
1 TABLET ORAL ONCE AS NEEDED
Status: DISCONTINUED | OUTPATIENT
Start: 2019-01-30 | End: 2019-01-31 | Stop reason: HOSPADM

## 2019-01-30 RX ORDER — ROPIVACAINE HYDROCHLORIDE 5 MG/ML
INJECTION, SOLUTION EPIDURAL; INFILTRATION; PERINEURAL
Status: COMPLETED | OUTPATIENT
Start: 2019-01-30 | End: 2019-01-30

## 2019-01-30 RX ORDER — ONDANSETRON 2 MG/ML
INJECTION INTRAMUSCULAR; INTRAVENOUS AS NEEDED
Status: DISCONTINUED | OUTPATIENT
Start: 2019-01-30 | End: 2019-01-30 | Stop reason: SURG

## 2019-01-30 RX ORDER — MIDAZOLAM HYDROCHLORIDE 1 MG/ML
2 INJECTION INTRAMUSCULAR; INTRAVENOUS
Status: DISCONTINUED | OUTPATIENT
Start: 2019-01-30 | End: 2019-01-30 | Stop reason: HOSPADM

## 2019-01-30 RX ORDER — FENTANYL CITRATE 50 UG/ML
INJECTION, SOLUTION INTRAMUSCULAR; INTRAVENOUS AS NEEDED
Status: DISCONTINUED | OUTPATIENT
Start: 2019-01-30 | End: 2019-01-30 | Stop reason: SURG

## 2019-01-30 RX ORDER — FENTANYL CITRATE 50 UG/ML
50 INJECTION, SOLUTION INTRAMUSCULAR; INTRAVENOUS
Status: DISCONTINUED | OUTPATIENT
Start: 2019-01-30 | End: 2019-01-30 | Stop reason: HOSPADM

## 2019-01-30 RX ORDER — LIDOCAINE HYDROCHLORIDE 10 MG/ML
0.5 INJECTION, SOLUTION EPIDURAL; INFILTRATION; INTRACAUDAL; PERINEURAL ONCE AS NEEDED
Status: DISCONTINUED | OUTPATIENT
Start: 2019-01-30 | End: 2019-01-30 | Stop reason: HOSPADM

## 2019-01-30 RX ORDER — SODIUM CHLORIDE 0.9 % (FLUSH) 0.9 %
3 SYRINGE (ML) INJECTION EVERY 12 HOURS SCHEDULED
Status: DISCONTINUED | OUTPATIENT
Start: 2019-01-30 | End: 2019-01-30 | Stop reason: HOSPADM

## 2019-01-30 RX ORDER — NALBUPHINE HCL 10 MG/ML
2 AMPUL (ML) INJECTION EVERY 4 HOURS PRN
Status: DISCONTINUED | OUTPATIENT
Start: 2019-01-30 | End: 2019-01-31 | Stop reason: HOSPADM

## 2019-01-30 RX ORDER — SODIUM CHLORIDE 0.9 % (FLUSH) 0.9 %
1-10 SYRINGE (ML) INJECTION AS NEEDED
Status: DISCONTINUED | OUTPATIENT
Start: 2019-01-30 | End: 2019-01-30 | Stop reason: HOSPADM

## 2019-01-30 RX ORDER — PROPOFOL 10 MG/ML
VIAL (ML) INTRAVENOUS AS NEEDED
Status: DISCONTINUED | OUTPATIENT
Start: 2019-01-30 | End: 2019-01-30 | Stop reason: SURG

## 2019-01-30 RX ORDER — HYDROMORPHONE HYDROCHLORIDE 1 MG/ML
0.5 INJECTION, SOLUTION INTRAMUSCULAR; INTRAVENOUS; SUBCUTANEOUS
Status: DISCONTINUED | OUTPATIENT
Start: 2019-01-30 | End: 2019-01-31 | Stop reason: HOSPADM

## 2019-01-30 RX ORDER — LIDOCAINE HYDROCHLORIDE 20 MG/ML
INJECTION, SOLUTION INFILTRATION; PERINEURAL AS NEEDED
Status: DISCONTINUED | OUTPATIENT
Start: 2019-01-30 | End: 2019-01-30 | Stop reason: SURG

## 2019-01-30 RX ORDER — ACETAMINOPHEN 325 MG/1
650 TABLET ORAL ONCE AS NEEDED
Status: DISCONTINUED | OUTPATIENT
Start: 2019-01-30 | End: 2019-01-31 | Stop reason: HOSPADM

## 2019-01-30 RX ORDER — ACETAMINOPHEN 650 MG/1
650 SUPPOSITORY RECTAL ONCE AS NEEDED
Status: DISCONTINUED | OUTPATIENT
Start: 2019-01-30 | End: 2019-01-31 | Stop reason: HOSPADM

## 2019-01-30 RX ORDER — OXYCODONE HYDROCHLORIDE AND ACETAMINOPHEN 5; 325 MG/1; MG/1
1 TABLET ORAL EVERY 4 HOURS PRN
Qty: 30 TABLET | Refills: 0 | Status: SHIPPED | OUTPATIENT
Start: 2019-01-30 | End: 2019-06-20

## 2019-01-30 RX ORDER — ACETAMINOPHEN 500 MG
500 TABLET ORAL ONCE
Status: COMPLETED | OUTPATIENT
Start: 2019-01-30 | End: 2019-01-30

## 2019-01-30 RX ORDER — SODIUM CHLORIDE, SODIUM LACTATE, POTASSIUM CHLORIDE, CALCIUM CHLORIDE 600; 310; 30; 20 MG/100ML; MG/100ML; MG/100ML; MG/100ML
9 INJECTION, SOLUTION INTRAVENOUS CONTINUOUS
Status: DISCONTINUED | OUTPATIENT
Start: 2019-01-30 | End: 2019-01-31 | Stop reason: HOSPADM

## 2019-01-30 RX ORDER — DEXAMETHASONE SODIUM PHOSPHATE 10 MG/ML
INJECTION INTRAMUSCULAR; INTRAVENOUS AS NEEDED
Status: DISCONTINUED | OUTPATIENT
Start: 2019-01-30 | End: 2019-01-30 | Stop reason: SURG

## 2019-01-30 RX ORDER — PROMETHAZINE HYDROCHLORIDE 25 MG/1
25 SUPPOSITORY RECTAL ONCE AS NEEDED
Status: DISCONTINUED | OUTPATIENT
Start: 2019-01-30 | End: 2019-01-31 | Stop reason: HOSPADM

## 2019-01-30 RX ADMIN — PHENYLEPHRINE HYDROCHLORIDE 100 MCG: 10 INJECTION INTRAVENOUS at 13:40

## 2019-01-30 RX ADMIN — EPHEDRINE SULFATE 10 MG: 50 INJECTION INTRAMUSCULAR; INTRAVENOUS; SUBCUTANEOUS at 13:25

## 2019-01-30 RX ADMIN — ONDANSETRON 4 MG: 2 INJECTION INTRAMUSCULAR; INTRAVENOUS at 14:20

## 2019-01-30 RX ADMIN — PHENYLEPHRINE HYDROCHLORIDE 100 MCG: 10 INJECTION INTRAVENOUS at 13:42

## 2019-01-30 RX ADMIN — EPHEDRINE SULFATE 10 MG: 50 INJECTION INTRAMUSCULAR; INTRAVENOUS; SUBCUTANEOUS at 13:31

## 2019-01-30 RX ADMIN — FENTANYL CITRATE 50 MCG: 50 INJECTION INTRAMUSCULAR; INTRAVENOUS at 14:22

## 2019-01-30 RX ADMIN — PHENYLEPHRINE HYDROCHLORIDE 100 MCG: 10 INJECTION INTRAVENOUS at 13:38

## 2019-01-30 RX ADMIN — SUGAMMADEX 200 MG: 100 INJECTION, SOLUTION INTRAVENOUS at 14:22

## 2019-01-30 RX ADMIN — PROPOFOL 200 MG: 10 INJECTION, EMULSION INTRAVENOUS at 13:13

## 2019-01-30 RX ADMIN — MIDAZOLAM HYDROCHLORIDE 2 MG: 2 INJECTION, SOLUTION INTRAMUSCULAR; INTRAVENOUS at 12:39

## 2019-01-30 RX ADMIN — FENTANYL CITRATE 50 MCG: 50 INJECTION, SOLUTION INTRAMUSCULAR; INTRAVENOUS at 12:39

## 2019-01-30 RX ADMIN — ACETAMINOPHEN 500 MG: 500 TABLET, FILM COATED ORAL at 12:38

## 2019-01-30 RX ADMIN — PHENYLEPHRINE HYDROCHLORIDE 100 MCG: 10 INJECTION INTRAVENOUS at 13:45

## 2019-01-30 RX ADMIN — DEXAMETHASONE SODIUM PHOSPHATE 4 MG: 10 INJECTION INTRAMUSCULAR; INTRAVENOUS at 14:20

## 2019-01-30 RX ADMIN — SODIUM CHLORIDE, POTASSIUM CHLORIDE, SODIUM LACTATE AND CALCIUM CHLORIDE 9 ML/HR: 600; 310; 30; 20 INJECTION, SOLUTION INTRAVENOUS at 12:18

## 2019-01-30 RX ADMIN — ROPIVACAINE HYDROCHLORIDE 20 ML: 5 INJECTION, SOLUTION EPIDURAL; INFILTRATION; PERINEURAL at 12:52

## 2019-01-30 RX ADMIN — CEFAZOLIN SODIUM 2 G: 2 INJECTION, SOLUTION INTRAVENOUS at 13:19

## 2019-01-30 RX ADMIN — EPHEDRINE SULFATE 10 MG: 50 INJECTION INTRAMUSCULAR; INTRAVENOUS; SUBCUTANEOUS at 13:35

## 2019-01-30 RX ADMIN — EPHEDRINE SULFATE 10 MG: 50 INJECTION INTRAMUSCULAR; INTRAVENOUS; SUBCUTANEOUS at 13:38

## 2019-01-30 RX ADMIN — EPHEDRINE SULFATE 10 MG: 50 INJECTION INTRAMUSCULAR; INTRAVENOUS; SUBCUTANEOUS at 13:28

## 2019-01-30 RX ADMIN — FENTANYL CITRATE 50 MCG: 50 INJECTION INTRAMUSCULAR; INTRAVENOUS at 13:19

## 2019-01-30 RX ADMIN — LIDOCAINE HYDROCHLORIDE 60 MG: 20 INJECTION, SOLUTION INFILTRATION; PERINEURAL at 13:13

## 2019-01-30 NOTE — ANESTHESIA PROCEDURE NOTES
Airway  Urgency: elective    Difficult airway    General Information and Staff    Patient location during procedure: OR  Anesthesiologist: Star Cartagena MD  CRNA: Tarah George CRNA    Indications and Patient Condition  Indications for airway management: airway protection    Preoxygenated: yes  Mask difficulty assessment: 2 - vent by mask + OA or adjuvant +/- NMBA    Final Airway Details  Final airway type: endotracheal airway      Successful airway: ETT  Cuffed: yes   Successful intubation technique: video laryngoscopy  Endotracheal tube insertion site: oral  ETT size (mm): 7.0  Placement verified by: chest auscultation and capnometry     Additional Comments  Glide scope advanced atraumatically and intubated without difficulty.  Lips and teeth remain unchanged as preop condition.

## 2019-01-30 NOTE — ANESTHESIA PROCEDURE NOTES
Peripheral Block    Pre-sedation assessment completed: 1/30/2019 12:41 PM    Patient reassessed immediately prior to procedure    Patient location during procedure: pre-op  Start time: 1/30/2019 12:42 PM  Stop time: 1/30/2019 12:58 PM  Reason for block: at surgeon's request and post-op pain management  Performed by  Anesthesiologist: Star Cartagena MD  Preanesthetic Checklist  Completed: patient identified, site marked, surgical consent, pre-op evaluation, timeout performed, IV checked, risks and benefits discussed and monitors and equipment checked  Prep:  Sterile barriers:gloves  Prep: ChloraPrep  Patient monitoring: blood pressure monitoring, continuous pulse oximetry and EKG  Procedure  Sedation:yes    Guidance:ultrasound guided  ULTRASOUND INTERPRETATION.  Using ultrasound guidance a 22 G gauge needle was placed in close proximity to the brachial plexus nerve, at which point, under ultrasound guidance anesthetic was injected in the area of the nerve and spread of the anesthesia was seen on ultrasound in close proximity thereto.  There were no abnormalities seen on ultrasound; a digital image was taken; and the patient tolerated the procedure with no complications. Images:still images obtained    Laterality:left  Block Type:interscalene  Injection Technique:single-shot  Needle Type:short-bevel  Needle Gauge:22 G    Medications Used: ropivacaine (NAROPIN) 0.5 % injection, 20 mL  Medications  Comment:.    Post Assessment  Injection Assessment: negative aspiration for heme, no paresthesia on injection and incremental injection  Patient Tolerance:comfortable throughout block  Complications:no

## 2019-01-30 NOTE — ANESTHESIA PREPROCEDURE EVALUATION
Anesthesia Evaluation     no history of anesthetic complications:  NPO Solid Status: > 8 hours             Airway   Mallampati: II  Neck ROM: full  No difficulty expected  Dental - normal exam     Pulmonary - negative pulmonary ROS and normal exam   Cardiovascular   Exercise tolerance: good (4-7 METS)    ECG reviewed  Rhythm: regular    (+) hypertension, hyperlipidemia,   (-) murmur      Neuro/Psych  GI/Hepatic/Renal/Endo    (+)  GERD,  diabetes mellitus,     Musculoskeletal     Abdominal    Substance History      OB/GYN          Other                        Anesthesia Plan    ASA 2     general with block     intravenous induction   Anesthetic plan, all risks, benefits, and alternatives have been provided, discussed and informed consent has been obtained with: patient.

## 2019-01-30 NOTE — ANESTHESIA POSTPROCEDURE EVALUATION
Patient: Evy Talley    Procedure Summary     Date:  01/30/19 Room / Location:   [facility]    Anesthesia Start:  1306 Anesthesia Stop:  1436    Procedure:  LEFT SHOULDER ARTHROSCOPY WITH ACROMIOPLASTY,LABRAL AND ROTATOR CUFF DEBRIDEMENT AND BICEPS TENOTOMY (Left Shoulder) Diagnosis:      Surgeon:  Ky Crum MD Provider:  Star Cartagena MD    Anesthesia Type:  general with block ASA Status:  2          Anesthesia Type: general with block  Last vitals  BP   130/61 (01/30/19 1544)   Temp   36.7 °C (98 °F) (01/30/19 1528)   Pulse   81 (01/30/19 1544)   Resp   16 (01/30/19 1544)     SpO2   96 % (01/30/19 1544)     Post Anesthesia Care and Evaluation    Patient location during evaluation: bedside  Patient participation: complete - patient participated  Level of consciousness: awake and alert  Pain management: adequate  Airway patency: patent  Anesthetic complications: No anesthetic complications    Cardiovascular status: acceptable  Respiratory status: acceptable  Hydration status: acceptable    Comments: /61 (BP Location: Right arm, Patient Position: Lying)   Pulse 81   Temp 36.7 °C (98 °F) (Temporal)   Resp 16   SpO2 96%

## 2019-03-07 RX ORDER — LOSARTAN POTASSIUM 100 MG/1
100 TABLET ORAL EVERY EVENING
Qty: 90 TABLET | Refills: 1 | Status: SHIPPED | OUTPATIENT
Start: 2019-03-07 | End: 2019-03-13 | Stop reason: SDUPTHER

## 2019-03-07 RX ORDER — LEVOTHYROXINE SODIUM 0.1 MG/1
100 TABLET ORAL DAILY
Qty: 90 TABLET | Refills: 1 | Status: SHIPPED | OUTPATIENT
Start: 2019-03-07 | End: 2019-12-23

## 2019-03-13 RX ORDER — LOSARTAN POTASSIUM 100 MG/1
TABLET ORAL
Qty: 90 TABLET | Refills: 1 | Status: SHIPPED | OUTPATIENT
Start: 2019-03-13 | End: 2019-07-17 | Stop reason: SDUPTHER

## 2019-03-13 RX ORDER — LEVOTHYROXINE SODIUM 0.1 MG/1
TABLET ORAL
Qty: 90 TABLET | Refills: 1 | Status: SHIPPED | OUTPATIENT
Start: 2019-03-13 | End: 2019-06-20 | Stop reason: SDUPTHER

## 2019-03-13 RX ORDER — ATORVASTATIN CALCIUM 20 MG/1
TABLET, FILM COATED ORAL
Qty: 90 TABLET | Refills: 0 | Status: SHIPPED | OUTPATIENT
Start: 2019-03-13 | End: 2019-06-06 | Stop reason: SDUPTHER

## 2019-04-12 RX ORDER — LORATADINE, PSEUDOEPHEDRINE 5; 120 MG/1; MG/1
TABLET, EXTENDED RELEASE ORAL
Qty: 60 TABLET | Refills: 0 | Status: SHIPPED | OUTPATIENT
Start: 2019-04-12 | End: 2019-07-25 | Stop reason: SDUPTHER

## 2019-04-29 ENCOUNTER — RESULTS ENCOUNTER (OUTPATIENT)
Dept: INTERNAL MEDICINE | Facility: CLINIC | Age: 64
End: 2019-04-29

## 2019-04-29 DIAGNOSIS — D72.829 LEUKOCYTOSIS, UNSPECIFIED TYPE: ICD-10-CM

## 2019-04-29 DIAGNOSIS — E11.9 TYPE 2 DIABETES MELLITUS WITHOUT COMPLICATION, WITHOUT LONG-TERM CURRENT USE OF INSULIN (HCC): ICD-10-CM

## 2019-05-01 DIAGNOSIS — D72.829 LEUKOCYTOSIS, UNSPECIFIED TYPE: Primary | ICD-10-CM

## 2019-05-01 LAB
ALBUMIN SERPL-MCNC: 4.4 G/DL (ref 3.5–5.2)
ALBUMIN/GLOB SERPL: 1.5 G/DL
ALP SERPL-CCNC: 98 U/L (ref 39–117)
ALT SERPL-CCNC: 27 U/L (ref 1–33)
AST SERPL-CCNC: 24 U/L (ref 1–32)
BASOPHILS # BLD AUTO: 0.19 10*3/MM3 (ref 0–0.2)
BASOPHILS NFR BLD AUTO: 1.2 % (ref 0–1.5)
BILIRUB SERPL-MCNC: 0.4 MG/DL (ref 0.2–1.2)
BUN SERPL-MCNC: 15 MG/DL (ref 8–23)
BUN/CREAT SERPL: 17.6 (ref 7–25)
CALCIUM SERPL-MCNC: 10.5 MG/DL (ref 8.6–10.5)
CHLORIDE SERPL-SCNC: 101 MMOL/L (ref 98–107)
CO2 SERPL-SCNC: 26.2 MMOL/L (ref 22–29)
CREAT SERPL-MCNC: 0.85 MG/DL (ref 0.57–1)
EOSINOPHIL # BLD AUTO: 0.26 10*3/MM3 (ref 0–0.4)
EOSINOPHIL NFR BLD AUTO: 1.7 % (ref 0.3–6.2)
ERYTHROCYTE [DISTWIDTH] IN BLOOD BY AUTOMATED COUNT: 20 % (ref 12.3–15.4)
GLOBULIN SER CALC-MCNC: 3 GM/DL
GLUCOSE SERPL-MCNC: 117 MG/DL (ref 65–99)
HBA1C MFR BLD: 6.48 % (ref 4.8–5.6)
HCT VFR BLD AUTO: 42.1 % (ref 34–46.6)
HGB BLD-MCNC: 12 G/DL (ref 12–15.9)
IMM GRANULOCYTES # BLD AUTO: 0.07 10*3/MM3 (ref 0–0.05)
IMM GRANULOCYTES NFR BLD AUTO: 0.5 % (ref 0–0.5)
LYMPHOCYTES # BLD AUTO: 3.89 10*3/MM3 (ref 0.7–3.1)
LYMPHOCYTES NFR BLD AUTO: 25.4 % (ref 19.6–45.3)
MCH RBC QN AUTO: 22.2 PG (ref 26.6–33)
MCHC RBC AUTO-ENTMCNC: 28.5 G/DL (ref 31.5–35.7)
MCV RBC AUTO: 77.8 FL (ref 79–97)
MONOCYTES # BLD AUTO: 0.77 10*3/MM3 (ref 0.1–0.9)
MONOCYTES NFR BLD AUTO: 5 % (ref 5–12)
NEUTROPHILS # BLD AUTO: 10.12 10*3/MM3 (ref 1.7–7)
NEUTROPHILS NFR BLD AUTO: 66.2 % (ref 42.7–76)
NRBC BLD AUTO-RTO: 0 /100 WBC (ref 0–0.2)
PLATELET # BLD AUTO: 565 10*3/MM3 (ref 140–450)
POTASSIUM SERPL-SCNC: 5.2 MMOL/L (ref 3.5–5.2)
PROT SERPL-MCNC: 7.4 G/DL (ref 6–8.5)
RBC # BLD AUTO: 5.41 10*6/MM3 (ref 3.77–5.28)
SODIUM SERPL-SCNC: 140 MMOL/L (ref 136–145)
WBC # BLD AUTO: 15.3 10*3/MM3 (ref 3.4–10.8)

## 2019-06-07 RX ORDER — ATORVASTATIN CALCIUM 20 MG/1
TABLET, FILM COATED ORAL
Qty: 90 TABLET | Refills: 1 | Status: SHIPPED | OUTPATIENT
Start: 2019-06-07 | End: 2019-12-10 | Stop reason: SDUPTHER

## 2019-06-19 NOTE — PROGRESS NOTES
Subjective Patient indicating that she is feeling relatively good those  does describe joint discomfort.    REASON FOR CONSULTATION:    Provide an opinion on any further workup or treatment                             REQUESTING PHYSICIAN:      RECORDS OBTAINED:  Records of the patients history including those obtained from the referring provider were reviewed and summarized in detail.    HISTORY OF PRESENT ILLNESS:  The patient is a 64 y.o. year old female who is here for an opinion about the above issue.    History of Present Illness      The patient 64-year-old female followed by primary care with hypertension, hyperlipidemia and GE reflux as well as hypothyroidism.  She more recently had injured her left shoulder with dislocation January 2019 and is reviewed by orthopedics with a left anterior glenohumeral joint dislocation and radial nerve palsy.  She was seen back in late January by primary care at which time it was also recognized that she has had a degree of leukocytosis documented per medical record at least since September 2015.  These are reviewed as consistent with normal differentials though associated with a degree of thrombocytosis and microcytic, hypochromic indices per her RBCs.  This includes January 2019 with H&H of 12.0 and 39.4, white count of 14,280, again normal automated differential and platelet count of 524,000 and repeat April 30 H&H 12.0 and 42.1 white count 15,300, normal automated differential and platelet count of 565,000.  She did proceed with left shoulder arthroscopy with rotator cuff and labral debridement, left shoulder biceps tenotomy and rotator cuff repair.  She had recent orthopedic assessment follow-up of her previous left knee replacement and now presents for assessment of her leukocytosis.  We discussed her history in detail including her diabetes, obesity, thyroid dysfunction, osteoarthritis and surgeries and previous ovarian carcinoid.  Symptomatically she does have a  hand pain that worsens particularly in the a.m  and improves throughout the day as well as degree of back pain.  She is otherwise without fever, chills, night sweats or generalized symptoms but does note routine dermatologic follow-up needs.  Past Medical History:   Diagnosis Date   • Diabetes mellitus (CMS/HCC)    • Essential hypertension    • GERD (gastroesophageal reflux disease)    • H/O Epidermal cyst of neck    • History of obesity    • Hx of renal calculi    • Hyperlipemia    • Hypothyroidism    • Leukocytosis    • Mild depression (CMS/HCC)    • Osteoarthritis     Lower leg, localized   • Ovarian cancer (CMS/HCC) 1995    ovarian carcanoid   • Rotator cuff tear         Past Surgical History:   Procedure Laterality Date   • APPENDECTOMY     • CARPAL TUNNEL RELEASE Left    • CHOLECYSTECTOMY     • COLONOSCOPY  2014   • CYSTOSCOPY W/ LASER LITHOTRIPSY     • HYSTERECTOMY     • KNEE ARTHROPLASTY Left     Replaced   • OOPHORECTOMY      Carcinoid of left ovary   • OTHER SURGICAL HISTORY      RESECTION OF OMENTUM   • PILONIDAL CYSTECTOMY     • SHOULDER ARTHROSCOPY W/ ROTATOR CUFF REPAIR Left 1/30/2019    Procedure: LEFT SHOULDER ARTHROSCOPY WITH ACROMIOPLASTY,LABRAL AND ROTATOR CUFF DEBRIDEMENT AND BICEPS TENOTOMY;  Surgeon: Ky Crum MD;  Location: [facility] OR [facility];  Service: Orthopedics   • SHOULDER MANIPULATION     • TUBAL ABDOMINAL LIGATION          Current Outpatient Medications on File Prior to Visit   Medication Sig Dispense Refill   • aspirin 81 MG tablet Take 1 tablet by mouth Daily. PT TO STOP PER MD INSTRUCTION     • atorvastatin (LIPITOR) 20 MG tablet TAKE 1 TABLET BY MOUTH AT BEDTIME  90 tablet 1   • B Complex Vitamins (VITAMIN-B COMPLEX PO) Take 1 tablet by mouth daily.     • Biotin (BIOTIN MAXIMUM STRENGTH) 10 MG tablet Take 1 capsule by mouth daily.     • Calcium Carb-Cholecalciferol (CALCIUM 600+D3 PO) Take 1 tablet by mouth daily.     • cyclobenzaprine (FLEXERIL) 10 MG tablet Take 1 tablet by  mouth 2 (two) times a day as needed.     • Dapagliflozin Propanediol (FARXIGA) 10 MG tablet Take 1 tablet by mouth Every Morning.     • Esomeprazole Magnesium (NEXIUM 24HR) 20 MG tablet delayed-release Take 1 capsule by mouth Daily.     • KLS ALLERCLEAR D-12HR 5-120 MG per 12 hr tablet TAKE ONE TABLET BY MOUTH ONE TIME DAILY  60 tablet 0   • levothyroxine (SYNTHROID, LEVOTHROID) 100 MCG tablet Take 1 tablet by mouth Daily. 90 tablet 1   • losartan (COZAAR) 100 MG tablet TAKE 1 TABLET BY MOUTH ONCE DAILY 90 tablet 1   • metFORMIN (GLUCOPHAGE) 500 MG tablet Take 500 mg by mouth 2 (Two) Times a Day With Meals.     • Multiple Vitamin (MULTI VITAMIN DAILY PO) Take 1 tablet by mouth daily.     • Omega-3 Fatty Acids (FISH OIL) 1000 MG capsule capsule Take 1,000 mg by mouth Daily With Breakfast. PT TO STOP PER MD INSTRUCTION     • sertraline (ZOLOFT) 100 MG tablet Take 100 mg by mouth Daily.     • vitamin B-12 (CYANOCOBALAMIN) 1000 MCG tablet Take 1,000 mcg by mouth daily.     • naproxen (NAPROSYN) 500 MG tablet Take 500 mg by mouth 2 (Two) Times a Day.  0   • [DISCONTINUED] levothyroxine (SYNTHROID, LEVOTHROID) 100 MCG tablet TAKE 1 TABLET BY MOUTH ONCE DAILY 90 tablet 1   • [DISCONTINUED] metFORMIN (GLUCOPHAGE) 500 MG tablet TAKE 1 TABLET BY MOUTH TWICE DAILY WITH MEALS 180 tablet 1   • [DISCONTINUED] oxyCODONE-acetaminophen (PERCOCET) 5-325 MG per tablet Take 1 tablet by mouth Every 4 (Four) Hours As Needed (Pain). 30 tablet 0     No current facility-administered medications on file prior to visit.         ALLERGIES:    Allergies   Allergen Reactions   • Influenza Vaccines Other (See Comments)     FAMILIAL REACTION        Social History     Socioeconomic History   • Marital status: Single     Spouse name: Not on file   • Number of children: 3   • Years of education: Not on file   • Highest education level: Not on file   Occupational History   • Occupation: Legal nurse consultant     Employer: [employer]   Tobacco Use   • Smoking status: Former Smoker     Years: 4.00     Last attempt to quit: 1976     Years since quittin.4   • Smokeless tobacco: Never Used   Substance and Sexual Activity   • Alcohol use: No   • Drug use: No   • Sexual activity: Defer        Family History   Problem Relation Age of Onset   • Transient ischemic attack Mother    • Lung cancer Father    • Malig Hyperthermia Neg Hx         Review of Systems   Constitutional: Positive for unexpected weight change.        Ongoing weight gain   Gastrointestinal:        Chronic indigestion   Musculoskeletal: Positive for arthralgias, back pain and joint swelling.        Objective     Vitals:    19 0758   BP: 140/74   Pulse: 93   Resp: 18   Temp: 98.4 °F (36.9 °C)   TempSrc: Oral   SpO2: 94%   Weight: 129 kg (285 lb 1.6 oz)   Height: 157.5 cm (62.01\")  Comment: new patient height   PainSc: 0-No pain     Current Status 2019   ECOG score 0       Physical Exam   Constitutional: She is oriented to person, place, and time. She appears well-developed and well-nourished.   Morbidly obese  female   HENT:   Head: Normocephalic and atraumatic.   Nose: Nose normal.   Mouth/Throat: Oropharynx is clear and moist.   Eyes: Conjunctivae and EOM are normal. Pupils are equal, round, and reactive to light.   Neck: Normal range of motion. Neck supple.   Cardiovascular: Normal rate, regular rhythm, normal heart sounds and intact distal pulses.   Pulmonary/Chest: Effort normal and breath sounds normal.   Abdominal: Soft. Bowel sounds are normal.   Morbidly obese  female   Musculoskeletal: Normal range of motion. She exhibits tenderness.   Neurological: She is alert and oriented to person, place, and time.   Skin: Skin is warm and dry. Rash noted.         RECENT LABS:  Hematology WBC   Date Value Ref Range Status   2019 14.64 (H) 3.40 - 10.80 10*3/mm3 Final   2019 15.30 (H) 3.40 - 10.80 10*3/mm3 Final     RBC   Date " Value Ref Range Status   06/20/2019 5.23 3.77 - 5.28 10*6/mm3 Final   04/30/2019 5.41 (H) 3.77 - 5.28 10*6/mm3 Final     Hemoglobin   Date Value Ref Range Status   06/20/2019 12.5 12.0 - 15.9 g/dL Final     Hematocrit   Date Value Ref Range Status   06/20/2019 39.6 34.0 - 46.6 % Final     Platelets   Date Value Ref Range Status   06/20/2019 493 (H) 140 - 450 10*3/mm3 Final          Assessment/Plan        The patient is a 64-year-old  female followed by primary care again with hypertension, hyperlipidemia, GE reflux and hypothyroidism as well as osteoarthritis with previous knee replacement and more recent left shoulder dislocation.  As she has been followed there is recognition of leukocytosis that has been documented at least since September 2015 but also in records from [facility] in and around her knee replacement in 2014.  In review of all of these peripheral blood counts there is no consistent either right or left shift differential or evidence of immature forms though there is now evidence of microcytic hyperchromic indices per her RBCs.  Review of her blood smear today demonstrates a leukocytosis with a normal differential, and again no immature forms but also evidence of microcytic hypochromic RBCs and thrombocytosis with otherwise normal platelets per size.  It is suspected that the patient has a reactive leukocytosis and has had this for some time as well as degree of very likely iron deficiency that is compounding her thrombocytosis which itself may also be reactive.  Discussed in the possibilities of an inflammatory condition seems likely and we have discussed a number of laboratory exams directed at this initially.  At present I do not feel the patient needs a bone marrow aspirate and biopsy but will proceed with initial laboratory testing including B12, MMA, sed rate, rheumatoid factor, iron profile, RADHA, PE and free light chains, ferritin, anti-CCP assessment, CMP and a conference of profile.   The patient will then be seen in approximately 4 to 6 weeks for follow-up assessment.  We have also, incidentally, discussed her morbid obesity and treatment approaches potentially for this as well.  Thank you for allowing me to see this patient consultation and please let me any questions concerning this case.

## 2019-06-20 ENCOUNTER — LAB (OUTPATIENT)
Dept: LAB | Facility: HOSPITAL | Age: 64
End: 2019-06-20

## 2019-06-20 ENCOUNTER — CONSULT (OUTPATIENT)
Dept: ONCOLOGY | Facility: CLINIC | Age: 64
End: 2019-06-20

## 2019-06-20 VITALS
DIASTOLIC BLOOD PRESSURE: 74 MMHG | HEIGHT: 62 IN | OXYGEN SATURATION: 94 % | WEIGHT: 285.1 LBS | HEART RATE: 93 BPM | BODY MASS INDEX: 52.46 KG/M2 | RESPIRATION RATE: 18 BRPM | SYSTOLIC BLOOD PRESSURE: 140 MMHG | TEMPERATURE: 98.4 F

## 2019-06-20 DIAGNOSIS — D72.829 LEUKOCYTOSIS, UNSPECIFIED TYPE: Primary | ICD-10-CM

## 2019-06-20 LAB
ALBUMIN SERPL-MCNC: 4.2 G/DL (ref 3.5–5.2)
ALBUMIN/GLOB SERPL: 1.2 G/DL (ref 1.1–2.4)
ALP SERPL-CCNC: 94 U/L (ref 38–116)
ALT SERPL W P-5'-P-CCNC: 24 U/L (ref 0–33)
ANION GAP SERPL CALCULATED.3IONS-SCNC: 13.7 MMOL/L
AST SERPL-CCNC: 23 U/L (ref 0–32)
BASOPHILS # BLD AUTO: 0.22 10*3/MM3 (ref 0–0.2)
BASOPHILS NFR BLD AUTO: 1.5 % (ref 0–1.5)
BILIRUB SERPL-MCNC: 0.3 MG/DL (ref 0.2–1.2)
BUN BLD-MCNC: 10 MG/DL (ref 6–20)
BUN/CREAT SERPL: 14.1 (ref 7.3–30)
CALCIUM SPEC-SCNC: 10.1 MG/DL (ref 8.5–10.2)
CHLORIDE SERPL-SCNC: 103 MMOL/L (ref 98–107)
CHROMATIN AB SERPL-ACNC: <10 IU/ML (ref 0–14)
CO2 SERPL-SCNC: 23.3 MMOL/L (ref 22–29)
CREAT BLD-MCNC: 0.71 MG/DL (ref 0.6–1.1)
CRP SERPL-MCNC: 0.42 MG/DL (ref 0–0.5)
DEPRECATED RDW RBC AUTO: 50.4 FL (ref 37–54)
EOSINOPHIL # BLD AUTO: 0.49 10*3/MM3 (ref 0–0.4)
EOSINOPHIL NFR BLD AUTO: 3.3 % (ref 0.3–6.2)
ERYTHROCYTE [DISTWIDTH] IN BLOOD BY AUTOMATED COUNT: 19.5 % (ref 12.3–15.4)
ERYTHROCYTE [SEDIMENTATION RATE] IN BLOOD: 31 MM/HR (ref 0–30)
FERRITIN SERPL-MCNC: 25.8 NG/ML (ref 13–150)
GFR SERPL CREATININE-BSD FRML MDRD: 82 ML/MIN/1.73
GLOBULIN UR ELPH-MCNC: 3.4 GM/DL (ref 1.8–3.5)
GLUCOSE BLD-MCNC: 127 MG/DL (ref 74–124)
HCT VFR BLD AUTO: 39.6 % (ref 34–46.6)
HGB BLD-MCNC: 12.5 G/DL (ref 12–15.9)
IMM GRANULOCYTES # BLD AUTO: 0.12 10*3/MM3 (ref 0–0.05)
IMM GRANULOCYTES NFR BLD AUTO: 0.8 % (ref 0–0.5)
IRON 24H UR-MRATE: 37 MCG/DL (ref 37–145)
IRON SATN MFR SERPL: 9 % (ref 14–48)
LYMPHOCYTES # BLD AUTO: 3.51 10*3/MM3 (ref 0.7–3.1)
LYMPHOCYTES NFR BLD AUTO: 24 % (ref 19.6–45.3)
MCH RBC QN AUTO: 23.9 PG (ref 26.6–33)
MCHC RBC AUTO-ENTMCNC: 31.6 G/DL (ref 31.5–35.7)
MCV RBC AUTO: 75.7 FL (ref 79–97)
MONOCYTES # BLD AUTO: 0.73 10*3/MM3 (ref 0.1–0.9)
MONOCYTES NFR BLD AUTO: 5 % (ref 5–12)
NEUTROPHILS # BLD AUTO: 9.57 10*3/MM3 (ref 1.7–7)
NEUTROPHILS NFR BLD AUTO: 65.4 % (ref 42.7–76)
NRBC BLD AUTO-RTO: 0 /100 WBC (ref 0–0.2)
PLATELET # BLD AUTO: 493 10*3/MM3 (ref 140–450)
PMV BLD AUTO: 10.1 FL (ref 6–12)
POTASSIUM BLD-SCNC: 4.2 MMOL/L (ref 3.5–4.7)
PROT SERPL-MCNC: 7.6 G/DL (ref 6.3–8)
RBC # BLD AUTO: 5.23 10*6/MM3 (ref 3.77–5.28)
SODIUM BLD-SCNC: 140 MMOL/L (ref 134–145)
TIBC SERPL-MCNC: 421 MCG/DL (ref 249–505)
TRANSFERRIN SERPL-MCNC: 301 MG/DL (ref 200–360)
VIT B12 BLD-MCNC: 1887 PG/ML (ref 211–946)
WBC NRBC COR # BLD: 14.64 10*3/MM3 (ref 3.4–10.8)

## 2019-06-20 PROCEDURE — 84466 ASSAY OF TRANSFERRIN: CPT | Performed by: INTERNAL MEDICINE

## 2019-06-20 PROCEDURE — 83540 ASSAY OF IRON: CPT | Performed by: INTERNAL MEDICINE

## 2019-06-20 PROCEDURE — 86140 C-REACTIVE PROTEIN: CPT | Performed by: INTERNAL MEDICINE

## 2019-06-20 PROCEDURE — 80053 COMPREHEN METABOLIC PANEL: CPT | Performed by: INTERNAL MEDICINE

## 2019-06-20 PROCEDURE — 36415 COLL VENOUS BLD VENIPUNCTURE: CPT | Performed by: INTERNAL MEDICINE

## 2019-06-20 PROCEDURE — 82607 VITAMIN B-12: CPT | Performed by: INTERNAL MEDICINE

## 2019-06-20 PROCEDURE — 85652 RBC SED RATE AUTOMATED: CPT | Performed by: INTERNAL MEDICINE

## 2019-06-20 PROCEDURE — 85025 COMPLETE CBC W/AUTO DIFF WBC: CPT | Performed by: INTERNAL MEDICINE

## 2019-06-20 PROCEDURE — 82728 ASSAY OF FERRITIN: CPT | Performed by: INTERNAL MEDICINE

## 2019-06-20 PROCEDURE — 86431 RHEUMATOID FACTOR QUANT: CPT | Performed by: INTERNAL MEDICINE

## 2019-06-20 PROCEDURE — 99244 OFF/OP CNSLTJ NEW/EST MOD 40: CPT | Performed by: INTERNAL MEDICINE

## 2019-06-20 RX ORDER — NAPROXEN 500 MG/1
500 TABLET ORAL AS NEEDED
Refills: 0 | COMMUNITY
Start: 2019-06-03

## 2019-06-21 LAB
ALBUMIN SERPL-MCNC: 3.7 G/DL (ref 2.9–4.4)
ALBUMIN/GLOB SERPL: 1.1 {RATIO} (ref 0.7–1.7)
ALPHA1 GLOB FLD ELPH-MCNC: 0.3 G/DL (ref 0–0.4)
ALPHA2 GLOB SERPL ELPH-MCNC: 0.9 G/DL (ref 0.4–1)
B-GLOBULIN SERPL ELPH-MCNC: 1.2 G/DL (ref 0.7–1.3)
CCP IGA+IGG SERPL IA-ACNC: 32 UNITS (ref 0–19)
CENTROMERE B AB SER-ACNC: <0.2 AI (ref 0–0.9)
CHROMATIN AB SERPL-ACNC: <0.2 AI (ref 0–0.9)
DSDNA AB SER-ACNC: <1 IU/ML (ref 0–9)
ENA JO1 AB SER-ACNC: <0.2 AI (ref 0–0.9)
ENA RNP AB SER-ACNC: <0.2 AI (ref 0–0.9)
ENA SCL70 AB SER-ACNC: <0.2 AI (ref 0–0.9)
ENA SM AB SER-ACNC: 0.2 AI (ref 0–0.9)
ENA SS-A AB SER-ACNC: <0.2 AI (ref 0–0.9)
ENA SS-B AB SER-ACNC: <0.2 AI (ref 0–0.9)
GAMMA GLOB SERPL ELPH-MCNC: 1 G/DL (ref 0.4–1.8)
GLOBULIN SER CALC-MCNC: 3.4 G/DL (ref 2.2–3.9)
IGA SERPL-MCNC: 164 MG/DL (ref 87–352)
IGG SERPL-MCNC: 941 MG/DL (ref 700–1600)
IGM SERPL-MCNC: 155 MG/DL (ref 26–217)
INTERPRETATION SERPL IEP-IMP: ABNORMAL
KAPPA LC SERPL-MCNC: 21.8 MG/L (ref 3.3–19.4)
KAPPA LC/LAMBDA SER: 1.03 {RATIO} (ref 0.26–1.65)
LAMBDA LC FREE SERPL-MCNC: 21.1 MG/L (ref 5.7–26.3)
Lab: ABNORMAL
Lab: NORMAL
M-SPIKE: ABNORMAL G/DL
PROT SERPL-MCNC: 7.1 G/DL (ref 6–8.5)

## 2019-06-25 LAB
Lab: NORMAL
METHYLMALONATE SERPL-SCNC: 141 NMOL/L (ref 0–378)

## 2019-07-18 RX ORDER — LOSARTAN POTASSIUM 100 MG/1
TABLET ORAL
Qty: 90 TABLET | Refills: 1 | Status: SHIPPED | OUTPATIENT
Start: 2019-07-18 | End: 2019-12-23

## 2019-07-22 NOTE — PROGRESS NOTES
Subjective Patient indicating that she is feeling relatively good those  does describe joint discomfort.      History of Present Illness      The patient 64-year-old female followed by primary care with hypertension, hyperlipidemia and GE reflux as well as hypothyroidism.  She more recently had injured her left shoulder with dislocation January 2019 and is reviewed by orthopedics with a left anterior glenohumeral joint dislocation and radial nerve palsy.  She was seen back in late January by primary care at which time it was also recognized that she has had a degree of leukocytosis documented per medical record at least since September 2015.  These are reviewed as consistent with normal differentials though associated with a degree of thrombocytosis and microcytic, hypochromic indices per her RBCs.  This includes January 2019 with H&H of 12.0 and 39.4, white count of 14,280, again normal automated differential and platelet count of 524,000 and repeat April 30 H&H 12.0 and 42.1 white count 15,300, normal automated differential and platelet count of 565,000.  She did proceed with left shoulder arthroscopy with rotator cuff and labral debridement, left shoulder biceps tenotomy and rotator cuff repair.  She had recent orthopedic assessment follow-up of her previous left knee replacement and now presents for assessment of her leukocytosis.  We discussed her history in detail consultation June 20, 2019 including her diabetes, obesity, thyroid dysfunction, osteoarthritis and surgeries and previous ovarian carcinoid.  Symptomatically she does have a hand pain that worsens particularly in the a.m  and improves throughout the day as well as degree of back pain.  She is otherwise without fever, chills, night sweats or generalized symptoms but does note routine dermatologic follow-up needs.     Upon consultation June 20, 2019 and series of studies were done including iron status reviewing iron deficiency with iron of 37, TIBC  421, 9% saturation, ferritin 25.8, CRP of 0.42, MMA of 141, B12 87, protein which pheresis without monoclonal spike, sed rate of 31, rheumatoid factor less than 10, RNP less than 0.2, Curry antibodies less than 0.2, CCP antibodies of 32 which is elevated.  These findings were discussed in detail with patient who indicates that she knew she had some degree of rheumatoid symptoms and that previously she been on iron earlier in her life for iron deficiency.  She has had colonoscopy done 2014 by Dr. Tejeda and, at this point, she is loath to repeat the examination.  She will undergo Cologard and I will notify Dr. Pastro.  Her general performance status remains excellent.  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   • Rotator cuff tear         Past Surgical History:   Procedure Laterality Date   • APPENDECTOMY     • CARPAL TUNNEL RELEASE Left    • CHOLECYSTECTOMY     • COLONOSCOPY  2014   • CYSTOSCOPY W/ LASER LITHOTRIPSY     • HYSTERECTOMY     • KNEE ARTHROPLASTY Left     Replaced   • OOPHORECTOMY      Carcinoid of left ovary   • OTHER SURGICAL HISTORY      RESECTION OF OMENTUM   • PILONIDAL CYSTECTOMY     • SHOULDER ARTHROSCOPY W/ ROTATOR CUFF REPAIR Left 1/30/2019    Procedure: LEFT SHOULDER ARTHROSCOPY WITH ACROMIOPLASTY,LABRAL AND ROTATOR CUFF DEBRIDEMENT AND BICEPS TENOTOMY;  Surgeon: Ky Crum MD;  Location[place] [facility] [place]  Service: Orthopedics   • SHOULDER MANIPULATION     • TUBAL ABDOMINAL LIGATION          Current Outpatient Medications on File Prior to Visit   Medication Sig Dispense Refill    • aspirin 81 MG tablet Take 1 tablet by mouth Daily. PT TO STOP PER MD INSTRUCTION     • atorvastatin (LIPITOR) 20 MG tablet TAKE 1 TABLET BY MOUTH AT BEDTIME  90 tablet 1   • B Complex Vitamins (VITAMIN-B COMPLEX PO) Take 1 tablet by mouth daily.     • Biotin (BIOTIN MAXIMUM STRENGTH) 10 MG tablet Take 1 capsule by mouth daily.     • Calcium Carb-Cholecalciferol (CALCIUM 600+D3 PO) Take 1 tablet by mouth daily.     • cyclobenzaprine (FLEXERIL) 10 MG tablet Take 1 tablet by mouth 2 (two) times a day as needed.     • Dapagliflozin Propanediol (FARXIGA) 10 MG tablet Take 1 tablet by mouth Every Morning.     • Esomeprazole Magnesium (NEXIUM 24HR) 20 MG tablet delayed-release Take 1 capsule by mouth Daily.     • KLS ALLERCLEAR D-12HR 5-120 MG per 12 hr tablet TAKE ONE TABLET BY MOUTH ONE TIME DAILY  60 tablet 0   • levothyroxine (SYNTHROID, LEVOTHROID) 100 MCG tablet Take 1 tablet by mouth Daily. 90 tablet 1   • losartan (COZAAR) 100 MG tablet TAKE 1 TABLET BY MOUTH ONCE DAILY 90 tablet 1   • metFORMIN (GLUCOPHAGE) 500 MG tablet Take 500 mg by mouth 2 (Two) Times a Day With Meals.     • Multiple Vitamin (MULTI VITAMIN DAILY PO) Take 1 tablet by mouth daily.     • naproxen (NAPROSYN) 500 MG tablet Take 500 mg by mouth 2 (Two) Times a Day.  0   • Omega-3 Fatty Acids (FISH OIL) 1000 MG capsule capsule Take 1,000 mg by mouth Daily With Breakfast. PT TO STOP PER MD INSTRUCTION     • sertraline (ZOLOFT) 100 MG tablet Take 100 mg by mouth Daily.     • [DISCONTINUED] vitamin B-12 (CYANOCOBALAMIN) 1000 MCG tablet Take 1,000 mcg by mouth daily.       No current facility-administered medications on file prior to visit.         ALLERGIES:    Allergies   Allergen Reactions   • Influenza Vaccines Other (See Comments)     FAMILIAL REACTION        Social History     Socioeconomic History   • Marital status: Single     Spouse name: Not on file   • Number of children: 3   • Years of education: College   • Highest education level: Not  "on file   Occupational History   • Occupation: Legal nurse consultant     Employer: [employer]   Tobacco Use   • Smoking status: Former Smoker     Packs/day: 0.75     Years: 4.00     Pack years: 3.00     Last attempt to quit: 1976     Years since quittin.5   • Smokeless tobacco: Never Used   Substance and Sexual Activity   • Alcohol use: No   • Drug use: No   • Sexual activity: Defer        Family History   Problem Relation Age of Onset   • Transient ischemic attack Mother    • Aneurysm Mother    • Hypertension Mother    • Lung cancer Father    • Heart disease Father    • Hypertension Father    • Sjogren's syndrome Sister    • Diabetes Maternal Grandmother    • Malig Hyperthermia Neg Hx         Review of Systems   Constitutional: Positive for unexpected weight change.        Ongoing weight gain   Gastrointestinal:        Chronic indigestion   Musculoskeletal: Positive for arthralgias, back pain and joint swelling.        Objective     Vitals:    19 1040   BP: 163/81   Pulse: 76   Resp: 20   Temp: 98.5 °F (36.9 °C)   TempSrc: Oral   SpO2: 94%   Weight: 131 kg (288 lb 1.6 oz)   Height: 157.5 cm (62.01\")   PainSc: 0-No pain     Current Status 2019   ECOG score 0       Physical Exam   Constitutional: She is oriented to person, place, and time. She appears well-developed and well-nourished.   Morbidly obese  female   HENT:   Head: Normocephalic and atraumatic.   Nose: Nose normal.   Mouth/Throat: Oropharynx is clear and moist.   Eyes: Conjunctivae and EOM are normal. Pupils are equal, round, and reactive to light.   Neck: Normal range of motion. Neck supple.   Cardiovascular: Normal rate, regular rhythm, normal heart sounds and intact distal pulses.   Pulmonary/Chest: Effort normal and breath sounds normal.   Abdominal: Soft. Bowel sounds are normal.   Morbidly obese  female   Musculoskeletal: Normal range of motion. She exhibits tenderness. "   Neurological: She is alert and oriented to person, place, and time.   Skin: Skin is warm and dry. Rash noted.         RECENT LABS:  Hematology WBC   Date Value Ref Range Status   07/23/2019 14.67 (H) 3.40 - 10.80 10*3/mm3 Final   04/30/2019 15.30 (H) 3.40 - 10.80 10*3/mm3 Final     RBC   Date Value Ref Range Status   07/23/2019 5.02 3.77 - 5.28 10*6/mm3 Final   04/30/2019 5.41 (H) 3.77 - 5.28 10*6/mm3 Final     Hemoglobin   Date Value Ref Range Status   07/23/2019 12.0 12.0 - 15.9 g/dL Final     Hematocrit   Date Value Ref Range Status   07/23/2019 38.5 34.0 - 46.6 % Final     Platelets   Date Value Ref Range Status   07/23/2019 466 (H) 140 - 450 10*3/mm3 Final          Assessment/Plan        The patient is a 64-year-old  female followed by primary care again with hypertension, hyperlipidemia, GE reflux and hypothyroidism as well as osteoarthritis with previous knee replacement and more recent left shoulder dislocation.  As she has been followed there is recognition of leukocytosis that has been documented at least since September 2015 but also in records from [facility] in and around her knee replacement in 2014.  In review of all of these peripheral blood counts there is no consistent either right or left shift differential or evidence of immature forms though there is now evidence of microcytic hyperchromic indices per her RBCs.  Review of her blood smear today demonstrates a leukocytosis with a normal differential, and again no immature forms but also evidence of microcytic hypochromic RBCs and thrombocytosis with otherwise normal platelets per size.  It is suspected that the patient has a reactive leukocytosis and has had this for some time as well as degree of very likely iron deficiency that is compounding her thrombocytosis which itself may also be reactive.  Discussed in the possibilities of an inflammatory condition seems likely and we have discussed a number of laboratory exams directed at this  initially.  At present I do not feel the patient needs a bone marrow aspirate and biopsy but will proceed with initial laboratory testing including B12, MMA, sed rate, rheumatoid factor, iron profile, RADHA, PE and free light chains, ferritin, anti-CCP assessment, CMP and a conference of profile.  The patient will then be seen in approximately 4 to 6 weeks for follow-up assessment.  We have also, incidentally, discussed her morbid obesity and treatment approaches potentially for this as well.     The patient is next seen July 23, 2019 and we have documented both serologic evidence of RA as well as ongoing evidence of iron deficiency.  We discussed follow-up colonoscopy but she declines doing this at this point and offered iron replacement therapy at least over the next several months.  She indicates that she would consider Cologard and I plan to contact the referral back to Dr. Pastor to have this done.  Plan:    *Ferrous sulfate 325 mg p.o. daily E-scribed  *Refer back to primary care- [facility]  *MD follow-up in 3 months with iron studies done 1 week prior

## 2019-07-23 ENCOUNTER — OFFICE VISIT (OUTPATIENT)
Dept: ONCOLOGY | Facility: CLINIC | Age: 64
End: 2019-07-23

## 2019-07-23 ENCOUNTER — LAB (OUTPATIENT)
Dept: OTHER | Facility: HOSPITAL | Age: 64
End: 2019-07-23

## 2019-07-23 VITALS
DIASTOLIC BLOOD PRESSURE: 81 MMHG | HEIGHT: 62 IN | HEART RATE: 76 BPM | OXYGEN SATURATION: 94 % | TEMPERATURE: 98.5 F | RESPIRATION RATE: 20 BRPM | WEIGHT: 288.1 LBS | SYSTOLIC BLOOD PRESSURE: 163 MMHG | BODY MASS INDEX: 53.02 KG/M2

## 2019-07-23 DIAGNOSIS — D50.0 IRON DEFICIENCY ANEMIA DUE TO CHRONIC BLOOD LOSS: Primary | ICD-10-CM

## 2019-07-23 DIAGNOSIS — D72.829 LEUKOCYTOSIS, UNSPECIFIED TYPE: Primary | ICD-10-CM

## 2019-07-23 LAB
BASOPHILS # BLD AUTO: 0.19 10*3/MM3 (ref 0–0.2)
BASOPHILS NFR BLD AUTO: 1.3 % (ref 0–1.5)
DEPRECATED RDW RBC AUTO: 49.1 FL (ref 37–54)
EOSINOPHIL # BLD AUTO: 0.39 10*3/MM3 (ref 0–0.4)
EOSINOPHIL NFR BLD AUTO: 2.7 % (ref 0.3–6.2)
ERYTHROCYTE [DISTWIDTH] IN BLOOD BY AUTOMATED COUNT: 18 % (ref 12.3–15.4)
HCT VFR BLD AUTO: 38.5 % (ref 34–46.6)
HGB BLD-MCNC: 12 G/DL (ref 12–15.9)
IMM GRANULOCYTES # BLD AUTO: 0.06 10*3/MM3 (ref 0–0.05)
IMM GRANULOCYTES NFR BLD AUTO: 0.4 % (ref 0–0.5)
LYMPHOCYTES # BLD AUTO: 3.7 10*3/MM3 (ref 0.7–3.1)
LYMPHOCYTES NFR BLD AUTO: 25.2 % (ref 19.6–45.3)
MCH RBC QN AUTO: 23.9 PG (ref 26.6–33)
MCHC RBC AUTO-ENTMCNC: 31.2 G/DL (ref 31.5–35.7)
MCV RBC AUTO: 76.7 FL (ref 79–97)
MONOCYTES # BLD AUTO: 0.86 10*3/MM3 (ref 0.1–0.9)
MONOCYTES NFR BLD AUTO: 5.9 % (ref 5–12)
NEUTROPHILS # BLD AUTO: 9.47 10*3/MM3 (ref 1.7–7)
NEUTROPHILS NFR BLD AUTO: 64.5 % (ref 42.7–76)
NRBC BLD AUTO-RTO: 0 /100 WBC (ref 0–0.2)
PLATELET # BLD AUTO: 466 10*3/MM3 (ref 140–450)
PMV BLD AUTO: 10.3 FL (ref 6–12)
RBC # BLD AUTO: 5.02 10*6/MM3 (ref 3.77–5.28)
WBC NRBC COR # BLD: 14.67 10*3/MM3 (ref 3.4–10.8)

## 2019-07-23 PROCEDURE — 36415 COLL VENOUS BLD VENIPUNCTURE: CPT

## 2019-07-23 PROCEDURE — 99214 OFFICE O/P EST MOD 30 MIN: CPT | Performed by: INTERNAL MEDICINE

## 2019-07-23 PROCEDURE — 85025 COMPLETE CBC W/AUTO DIFF WBC: CPT | Performed by: INTERNAL MEDICINE

## 2019-07-23 RX ORDER — FERROUS SULFATE 325(65) MG
325 TABLET ORAL
Qty: 30 TABLET | Refills: 3 | Status: ON HOLD | OUTPATIENT
Start: 2019-07-23 | End: 2019-11-13

## 2019-07-25 RX ORDER — LORATADINE, PSEUDOEPHEDRINE 5; 120 MG/1; MG/1
TABLET, EXTENDED RELEASE ORAL
Qty: 60 TABLET | Refills: 1 | Status: SHIPPED | OUTPATIENT
Start: 2019-07-25 | End: 2020-03-09

## 2019-08-07 ENCOUNTER — OFFICE VISIT (OUTPATIENT)
Dept: INTERNAL MEDICINE | Facility: CLINIC | Age: 64
End: 2019-08-07

## 2019-08-07 ENCOUNTER — RESULTS ENCOUNTER (OUTPATIENT)
Dept: INTERNAL MEDICINE | Facility: CLINIC | Age: 64
End: 2019-08-07

## 2019-08-07 VITALS
WEIGHT: 288 LBS | HEIGHT: 62 IN | BODY MASS INDEX: 53 KG/M2 | TEMPERATURE: 98.3 F | HEART RATE: 79 BPM | SYSTOLIC BLOOD PRESSURE: 148 MMHG | DIASTOLIC BLOOD PRESSURE: 80 MMHG | OXYGEN SATURATION: 97 %

## 2019-08-07 DIAGNOSIS — Z12.11 SCREENING FOR MALIGNANT NEOPLASM OF COLON: ICD-10-CM

## 2019-08-07 DIAGNOSIS — E78.5 HYPERLIPIDEMIA, UNSPECIFIED HYPERLIPIDEMIA TYPE: ICD-10-CM

## 2019-08-07 DIAGNOSIS — E03.9 ACQUIRED HYPOTHYROIDISM: ICD-10-CM

## 2019-08-07 DIAGNOSIS — D64.9 ANEMIA, UNSPECIFIED TYPE: ICD-10-CM

## 2019-08-07 DIAGNOSIS — I10 ESSENTIAL HYPERTENSION: Primary | ICD-10-CM

## 2019-08-07 DIAGNOSIS — E11.9 TYPE 2 DIABETES MELLITUS WITHOUT COMPLICATION, WITHOUT LONG-TERM CURRENT USE OF INSULIN (HCC): ICD-10-CM

## 2019-08-07 PROCEDURE — 99214 OFFICE O/P EST MOD 30 MIN: CPT | Performed by: INTERNAL MEDICINE

## 2019-08-07 NOTE — PROGRESS NOTES
Subjective   Evy Talley is a 64 y.o. female here to f/u on hypothyroidism, HPL, DM with labs.    History of Present Illness   Pt has been taking BP meds as prescribed without any problems.  No HA  No episodes of orthostasis  Pt has been compliant with diabetes meds. No side effects from medication No episodes of hypoglycemia. Patient denies any polyuria or polydipsia  Pt has been doing well with thyroid meds.  Taking as perscribed without any complications    The following portions of the patient's history were reviewed and updated as appropriate: allergies, current medications, past medical history, past social history and problem list.  She is fresh off vacation and a wedding  She is getting back on her reg diet    Review of Systems   All other systems reviewed and are negative.      Objective   Physical Exam   Constitutional: She is oriented to person, place, and time. She appears well-developed and well-nourished.   HENT:   Head: Normocephalic and atraumatic.   Right Ear: External ear normal.   Left Ear: External ear normal.   Mouth/Throat: Oropharynx is clear and moist.   Eyes: Conjunctivae and EOM are normal. Pupils are equal, round, and reactive to light.   Neck: Normal range of motion. No tracheal deviation present. No thyromegaly present.   Cardiovascular: Normal rate, regular rhythm, normal heart sounds and intact distal pulses.   Pulmonary/Chest: Effort normal and breath sounds normal.   Abdominal: Soft. Bowel sounds are normal. She exhibits no distension. There is no tenderness.   Musculoskeletal: Normal range of motion. She exhibits no edema or deformity.   Neurological: She is alert and oriented to person, place, and time.   Skin: Skin is warm and dry.   Psychiatric: She has a normal mood and affect. Her behavior is normal. Judgment and thought content normal.   Vitals reviewed.      Vitals:    08/07/19 0731   BP: 148/80   Pulse: 79   Temp: 98.3 °F (36.8 °C)   SpO2: 97%     Current Outpatient  Medications:   •  aspirin 81 MG tablet, Take 1 tablet by mouth Daily. PT TO STOP PER MD INSTRUCTION, Disp: , Rfl:   •  atorvastatin (LIPITOR) 20 MG tablet, TAKE 1 TABLET BY MOUTH AT BEDTIME , Disp: 90 tablet, Rfl: 1  •  B Complex Vitamins (VITAMIN-B COMPLEX PO), Take 1 tablet by mouth daily., Disp: , Rfl:   •  Biotin (BIOTIN MAXIMUM STRENGTH) 10 MG tablet, Take 1 capsule by mouth daily., Disp: , Rfl:   •  Calcium Carb-Cholecalciferol (CALCIUM 600+D3 PO), Take 1 tablet by mouth daily., Disp: , Rfl:   •  cyclobenzaprine (FLEXERIL) 10 MG tablet, Take 1 tablet by mouth 2 (two) times a day as needed., Disp: , Rfl:   •  Dapagliflozin Propanediol (FARXIGA) 10 MG tablet, Take 1 tablet by mouth Every Morning., Disp: , Rfl:   •  Esomeprazole Magnesium (NEXIUM 24HR) 20 MG tablet delayed-release, Take 1 capsule by mouth Daily., Disp: , Rfl:   •  ferrous sulfate 325 (65 FE) MG tablet, Take 1 tablet by mouth Daily With Breakfast., Disp: 30 tablet, Rfl: 3  •  KLS ALLERCLEAR D-12HR 5-120 MG per 12 hr tablet, TAKE ONE TABLET BY MOUTH ONE TIME DAILY , Disp: 60 tablet, Rfl: 1  •  levothyroxine (SYNTHROID, LEVOTHROID) 100 MCG tablet, Take 1 tablet by mouth Daily., Disp: 90 tablet, Rfl: 1  •  losartan (COZAAR) 100 MG tablet, TAKE 1 TABLET BY MOUTH ONCE DAILY, Disp: 90 tablet, Rfl: 1  •  metFORMIN (GLUCOPHAGE) 500 MG tablet, Take 500 mg by mouth 2 (Two) Times a Day With Meals., Disp: , Rfl:   •  Multiple Vitamin (MULTI VITAMIN DAILY PO), Take 1 tablet by mouth daily., Disp: , Rfl:   •  naproxen (NAPROSYN) 500 MG tablet, Take 500 mg by mouth 2 (Two) Times a Day., Disp: , Rfl: 0  •  Omega-3 Fatty Acids (FISH OIL) 1000 MG capsule capsule, Take 1,000 mg by mouth Daily With Breakfast. PT TO STOP PER MD INSTRUCTION, Disp: , Rfl:   •  sertraline (ZOLOFT) 100 MG tablet, Take 100 mg by mouth Daily., Disp: , Rfl:          Assessment/Plan   Diagnoses and all orders for this visit:    Essential hypertension    Hyperlipidemia, unspecified  hyperlipidemia type    Type 2 diabetes mellitus without complication, without long-term current use of insulin (CMS/HCC)    Acquired hypothyroidism      1.  HTN- ok with current meds  2.  HPL- ok with current dose of lipitor  3.  DM-SHe is stable with current meds  4.  ?RA-  She is seeing heme but not having a lot of issue   5.  LEukocytosis-  Following with [facility]

## 2019-09-26 DIAGNOSIS — D64.9 ANEMIA, UNSPECIFIED TYPE: Primary | ICD-10-CM

## 2019-09-26 DIAGNOSIS — R19.5 POSITIVE COLORECTAL CANCER SCREENING USING COLOGUARD TEST: ICD-10-CM

## 2019-10-08 ENCOUNTER — LAB (OUTPATIENT)
Dept: OTHER | Facility: HOSPITAL | Age: 64
End: 2019-10-08

## 2019-10-08 DIAGNOSIS — D50.0 IRON DEFICIENCY ANEMIA DUE TO CHRONIC BLOOD LOSS: ICD-10-CM

## 2019-10-08 LAB
BASOPHILS # BLD AUTO: 0.16 10*3/MM3 (ref 0–0.2)
BASOPHILS NFR BLD AUTO: 1.1 % (ref 0–1.5)
DEPRECATED RDW RBC AUTO: 51.3 FL (ref 37–54)
EOSINOPHIL # BLD AUTO: 0.31 10*3/MM3 (ref 0–0.4)
EOSINOPHIL NFR BLD AUTO: 2.2 % (ref 0.3–6.2)
ERYTHROCYTE [DISTWIDTH] IN BLOOD BY AUTOMATED COUNT: 17.8 % (ref 12.3–15.4)
FERRITIN SERPL-MCNC: 37 NG/ML (ref 13–150)
HCT VFR BLD AUTO: 40.4 % (ref 34–46.6)
HGB BLD-MCNC: 12.6 G/DL (ref 12–15.9)
IMM GRANULOCYTES # BLD AUTO: 0.07 10*3/MM3 (ref 0–0.05)
IMM GRANULOCYTES NFR BLD AUTO: 0.5 % (ref 0–0.5)
IRON 24H UR-MRATE: 37 MCG/DL (ref 37–145)
IRON SATN MFR SERPL: 9 % (ref 20–50)
LYMPHOCYTES # BLD AUTO: 3.18 10*3/MM3 (ref 0.7–3.1)
LYMPHOCYTES NFR BLD AUTO: 22.4 % (ref 19.6–45.3)
MCH RBC QN AUTO: 25 PG (ref 26.6–33)
MCHC RBC AUTO-ENTMCNC: 31.2 G/DL (ref 31.5–35.7)
MCV RBC AUTO: 80.3 FL (ref 79–97)
MONOCYTES # BLD AUTO: 0.72 10*3/MM3 (ref 0.1–0.9)
MONOCYTES NFR BLD AUTO: 5.1 % (ref 5–12)
NEUTROPHILS # BLD AUTO: 9.78 10*3/MM3 (ref 1.7–7)
NEUTROPHILS NFR BLD AUTO: 68.7 % (ref 42.7–76)
NRBC BLD AUTO-RTO: 0 /100 WBC (ref 0–0.2)
PLATELET # BLD AUTO: 466 10*3/MM3 (ref 140–450)
PMV BLD AUTO: 10 FL (ref 6–12)
RBC # BLD AUTO: 5.03 10*6/MM3 (ref 3.77–5.28)
TIBC SERPL-MCNC: 390 MCG/DL (ref 298–536)
TRANSFERRIN SERPL-MCNC: 262 MG/DL (ref 200–360)
WBC NRBC COR # BLD: 14.22 10*3/MM3 (ref 3.4–10.8)

## 2019-10-08 PROCEDURE — 36415 COLL VENOUS BLD VENIPUNCTURE: CPT

## 2019-10-08 PROCEDURE — 85025 COMPLETE CBC W/AUTO DIFF WBC: CPT | Performed by: INTERNAL MEDICINE

## 2019-10-08 PROCEDURE — 82728 ASSAY OF FERRITIN: CPT | Performed by: INTERNAL MEDICINE

## 2019-10-08 PROCEDURE — 84466 ASSAY OF TRANSFERRIN: CPT | Performed by: INTERNAL MEDICINE

## 2019-10-08 PROCEDURE — 83540 ASSAY OF IRON: CPT | Performed by: INTERNAL MEDICINE

## 2019-10-08 NOTE — PROGRESS NOTES
Subjective Patient indicating that she is feeling relatively good those  does describe joint discomfort and continued fatigue.    History of Present Illness      The patient 64-year-old female followed by primary care with hypertension, hyperlipidemia and GE reflux as well as hypothyroidism.  She more recently had injured her left shoulder with dislocation January 2019 and is reviewed by orthopedics with a left anterior glenohumeral joint dislocation and radial nerve palsy.  She was seen back in late January by primary care at which time it was also recognized that she has had a degree of leukocytosis documented per medical record at least since September 2015.  These are reviewed as consistent with normal differentials though associated with a degree of thrombocytosis and microcytic, hypochromic indices per her RBCs.  This includes January 2019 with H&H of 12.0 and 39.4, white count of 14,280, again normal automated differential and platelet count of 524,000 and repeat April 30 H&H 12.0 and 42.1 white count 15,300, normal automated differential and platelet count of 565,000.  She did proceed with left shoulder arthroscopy with rotator cuff and labral debridement, left shoulder biceps tenotomy and rotator cuff repair.  She had recent orthopedic assessment follow-up of her previous left knee replacement and now presents for assessment of her leukocytosis.  We discussed her history in detail consultation June 20, 2019 including her diabetes, obesity, thyroid dysfunction, osteoarthritis and surgeries and previous ovarian carcinoid.  Symptomatically she does have a hand pain that worsens particularly in the a.m  and improves throughout the day as well as degree of back pain.  She is otherwise without fever, chills, night sweats or generalized symptoms but does note routine dermatologic follow-up needs.     Upon consultation June 20, 2019 and series of studies were done including iron status reviewing iron deficiency  with iron of 37, TIBC 421, 9% saturation, ferritin 25.8, CRP of 0.42, MMA of 141, B12 87, protein which pheresis without monoclonal spike, sed rate of 31, rheumatoid factor less than 10, RNP less than 0.2, Curry antibodies less than 0.2, CCP antibodies of 32 which is elevated.  These findings were discussed in detail with patient who indicates that she knew she had some degree of rheumatoid symptoms and that previously she been on iron earlier in her life for iron deficiency.  She has had colonoscopy done 2014 by Dr. Tejeda and, at this point, she is loath to repeat the examination.  She will undergo Cologard and I will notify Dr. Pastor.  Her general performance status remains excellent.  The patient is seen back October 15, 2019 and is having increasing fatigue and, indeed, has been found to have a positive Cologuard.  She is scheduled to see GI medicine in the next week and will likely undergo upper and lower endoscopy.  We have further discussed her positive, consistent findings of leukocytosis as well as her relative intolerance to oral iron all of which leads to additional diagnostics with a follow-up CT and the offering of intravenous iron.  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   • Rotator cuff tear         Past Surgical History:   Procedure Laterality Date   • APPENDECTOMY     • CARPAL TUNNEL RELEASE Left    • CHOLECYSTECTOMY     • COLONOSCOPY  2014   • CYSTOSCOPY W/ LASER LITHOTRIPSY     • HYSTERECTOMY     • KNEE ARTHROPLASTY Left     Replaced   • OOPHORECTOMY      Carcinoid of left ovary   •  OTHER SURGICAL HISTORY      RESECTION OF OMENTUM   • PILONIDAL CYSTECTOMY     • SHOULDER ARTHROSCOPY W/ ROTATOR CUFF REPAIR Left 1/30/2019    Procedure: LEFT SHOULDER ARTHROSCOPY WITH ACROMIOPLASTY,LABRAL AND ROTATOR CUFF DEBRIDEMENT AND BICEPS TENOTOMY;  Surgeon: Ky Crum MD;  Location: [facility] OR [facility] [place];  Service: Orthopedics   • SHOULDER MANIPULATION     • TUBAL ABDOMINAL LIGATION          Current Outpatient Medications on File Prior to Visit   Medication Sig Dispense Refill   • aspirin 81 MG tablet Take 1 tablet by mouth Daily. PT TO STOP PER MD INSTRUCTION     • atorvastatin (LIPITOR) 20 MG tablet TAKE 1 TABLET BY MOUTH AT BEDTIME  90 tablet 1   • B Complex Vitamins (VITAMIN-B COMPLEX PO) Take 1 tablet by mouth daily.     • Biotin (BIOTIN MAXIMUM STRENGTH) 10 MG tablet Take 1 capsule by mouth daily.     • Calcium Carb-Cholecalciferol (CALCIUM 600+D3 PO) Take 1 tablet by mouth daily.     • cyclobenzaprine (FLEXERIL) 10 MG tablet Take 1 tablet by mouth 2 (two) times a day as needed.     • Dapagliflozin Propanediol (FARXIGA) 10 MG tablet Take 1 tablet by mouth Every Morning.     • Esomeprazole Magnesium (NEXIUM 24HR) 20 MG tablet delayed-release Take 1 capsule by mouth Daily.     • ferrous sulfate 325 (65 FE) MG tablet Take 1 tablet by mouth Daily With Breakfast. 30 tablet 3   • KLS ALLERCLEAR D-12HR 5-120 MG per 12 hr tablet TAKE ONE TABLET BY MOUTH ONE TIME DAILY  60 tablet 1   • levothyroxine (SYNTHROID, LEVOTHROID) 100 MCG tablet Take 1 tablet by mouth Daily. 90 tablet 1   • losartan (COZAAR) 100 MG tablet TAKE 1 TABLET BY MOUTH ONCE DAILY 90 tablet 1   • metFORMIN (GLUCOPHAGE) 500 MG tablet Take 500 mg by mouth 2 (Two) Times a Day With Meals.     • metFORMIN (GLUCOPHAGE) 500 MG tablet TAKE 1 TABLET BY MOUTH TWICE DAILY WITH MEALS 180 tablet 1   • Multiple Vitamin (MULTI VITAMIN DAILY PO) Take 1 tablet by mouth daily.     • Omega-3 Fatty Acids (FISH OIL) 1000 MG capsule capsule Take 1,000 mg by mouth Daily  "With Breakfast. PT TO STOP PER MD INSTRUCTION     • sertraline (ZOLOFT) 100 MG tablet Take 100 mg by mouth Daily.     • naproxen (NAPROSYN) 500 MG tablet Take 500 mg by mouth 2 (Two) Times a Day.  0     No current facility-administered medications on file prior to visit.         ALLERGIES:    Allergies   Allergen Reactions   • Influenza Vaccines Other (See Comments)     FAMILIAL REACTION        Social History     Socioeconomic History   • Marital status: Single     Spouse name: Not on file   • Number of children: 3   • Years of education: College   • Highest education level: Not on file   Occupational History   • Occupation: Legal nurse consultant     Employer: [employer]   Tobacco Use   • Smoking status: Former Smoker     Packs/day: 0.75     Years: 4.00     Pack years: 3.00     Last attempt to quit: 1976     Years since quittin.7   • Smokeless tobacco: Never Used   Substance and Sexual Activity   • Alcohol use: No   • Drug use: No   • Sexual activity: Defer        Family History   Problem Relation Age of Onset   • Transient ischemic attack Mother    • Aneurysm Mother    • Hypertension Mother    • Lung cancer Father    • Heart disease Father    • Hypertension Father    • Sjogren's syndrome Sister    • Diabetes Maternal Grandmother    • Malig Hyperthermia Neg Hx         Review of Systems   Constitutional: Positive for fatigue and unexpected weight change.        Ongoing weight gain   Respiratory: Negative for chest tightness, shortness of breath and wheezing.    Gastrointestinal: Negative for abdominal pain, constipation, diarrhea, nausea and vomiting.        Chronic indigestion   Musculoskeletal: Positive for arthralgias, back pain and joint swelling.        Objective     Vitals:    10/15/19 1024   BP: 144/79   Pulse: 85   Resp: 20   Temp: 98.1 °F (36.7 °C)   TempSrc: Oral   SpO2: 95%   Weight: 130 kg (286 lb 1.6 oz)   Height: 157.5 cm (62.01\")   PainSc: 0-No pain     Current " Status 10/15/2019   ECOG score 0       Physical Exam   Constitutional: She is oriented to person, place, and time. She appears well-developed and well-nourished.   Morbidly obese  female   HENT:   Head: Normocephalic and atraumatic.   Nose: Nose normal.   Mouth/Throat: Oropharynx is clear and moist.   Eyes: Conjunctivae and EOM are normal. Pupils are equal, round, and reactive to light.   Neck: Normal range of motion. Neck supple.   Cardiovascular: Normal rate, regular rhythm, normal heart sounds and intact distal pulses.   Pulmonary/Chest: Effort normal and breath sounds normal.   Abdominal: Soft. Bowel sounds are normal.   Morbidly obese  female   Musculoskeletal: Normal range of motion. She exhibits tenderness.   Neurological: She is alert and oriented to person, place, and time.   Skin: Skin is warm and dry. Rash noted.         RECENT LABS:  Hematology WBC   Date Value Ref Range Status   10/08/2019 14.22 (H) 3.40 - 10.80 10*3/mm3 Final   04/30/2019 15.30 (H) 3.40 - 10.80 10*3/mm3 Final     RBC   Date Value Ref Range Status   10/08/2019 5.03 3.77 - 5.28 10*6/mm3 Final   04/30/2019 5.41 (H) 3.77 - 5.28 10*6/mm3 Final     Hemoglobin   Date Value Ref Range Status   10/08/2019 12.6 12.0 - 15.9 g/dL Final     Hematocrit   Date Value Ref Range Status   10/08/2019 40.4 34.0 - 46.6 % Final     Platelets   Date Value Ref Range Status   10/08/2019 466 (H) 140 - 450 10*3/mm3 Final          Assessment/Plan        The patient is a 64-year-old  female followed by primary care again with hypertension, hyperlipidemia, GE reflux and hypothyroidism as well as osteoarthritis with previous knee replacement and more recent left shoulder dislocation.  As she has been followed there is recognition of leukocytosis that has been documented at least since September 2015 but also in records from [facility] in and around her knee replacement in 2014.  In review of all of these peripheral blood counts there is no  consistent either right or left shift differential or evidence of immature forms though there is now evidence of microcytic hyperchromic indices per her RBCs.  Review of her blood smear today demonstrates a leukocytosis with a normal differential, and again no immature forms but also evidence of microcytic hypochromic RBCs and thrombocytosis with otherwise normal platelets per size.  It is suspected that the patient has a reactive leukocytosis and has had this for some time as well as degree of very likely iron deficiency that is compounding her thrombocytosis which itself may also be reactive.  Discussed in the possibilities of an inflammatory condition seems likely and we have discussed a number of laboratory exams directed at this initially.  At present I do not feel the patient needs a bone marrow aspirate and biopsy but will proceed with initial laboratory testing including B12, MMA, sed rate, rheumatoid factor, iron profile, RADHA, PE and free light chains, ferritin, anti-CCP assessment, CMP and a conference of profile.  The patient will then be seen in approximately 4 to 6 weeks for follow-up assessment.  We have also, incidentally, discussed her morbid obesity and treatment approaches potentially for this as well.     The patient is next seen July 23, 2019 and we have documented both serologic evidence of RA as well as ongoing evidence of iron deficiency.  We discussed follow-up colonoscopy but she declines doing this at this point and offered iron replacement therapy at least over the next several months.  She indicates that she would consider Cologard and I plan to contact the referral back to Dr. Pastor to have this done.  The patient went on to have this done and was found to have a positive Cologuard.  This is of course concerning and the patient will need to undergo upper and lower endoscopy but also is offered a CT scan of the abdomen the very least under the circumstances.  She is intolerant to oral  iron in a longer conversation today and she is still iron deficient.  After considerable discussion plan:    *Discontinue oral iron  *1 week return for IV Injectafer  *Same day requested CT scan of abdomen  *GI consultation already scheduled this Thursday, October 17  *Return in 3 to 4 weeks for general reassessment

## 2019-10-15 ENCOUNTER — APPOINTMENT (OUTPATIENT)
Dept: OTHER | Facility: HOSPITAL | Age: 64
End: 2019-10-15

## 2019-10-15 ENCOUNTER — OFFICE VISIT (OUTPATIENT)
Dept: ONCOLOGY | Facility: CLINIC | Age: 64
End: 2019-10-15

## 2019-10-15 VITALS
BODY MASS INDEX: 52.65 KG/M2 | HEIGHT: 62 IN | TEMPERATURE: 98.1 F | DIASTOLIC BLOOD PRESSURE: 79 MMHG | RESPIRATION RATE: 20 BRPM | SYSTOLIC BLOOD PRESSURE: 144 MMHG | OXYGEN SATURATION: 95 % | HEART RATE: 85 BPM | WEIGHT: 286.1 LBS

## 2019-10-15 DIAGNOSIS — D50.0 IRON DEFICIENCY ANEMIA DUE TO CHRONIC BLOOD LOSS: Primary | ICD-10-CM

## 2019-10-15 DIAGNOSIS — D72.829 LEUKOCYTOSIS, UNSPECIFIED TYPE: ICD-10-CM

## 2019-10-15 PROBLEM — IMO0001 ADVERSE EFFECT OF IRON OR ITS COMPOUND, SUBSEQUENT ENCOUNTER: Status: ACTIVE | Noted: 2019-10-15

## 2019-10-15 PROCEDURE — G0463 HOSPITAL OUTPT CLINIC VISIT: HCPCS | Performed by: INTERNAL MEDICINE

## 2019-10-15 PROCEDURE — 99214 OFFICE O/P EST MOD 30 MIN: CPT | Performed by: INTERNAL MEDICINE

## 2019-10-15 NOTE — PROGRESS NOTES
Supportive plan entered for Injectafer 750 mg IV x one dose with Compazine 10 mg PO to be given as premedication V.O. Dr. Corey  Message to KAMILAH Cadet RN for insurance approval. DL

## 2019-10-17 ENCOUNTER — OFFICE VISIT (OUTPATIENT)
Dept: GASTROENTEROLOGY | Facility: CLINIC | Age: 64
End: 2019-10-17

## 2019-10-17 VITALS
BODY MASS INDEX: 52.43 KG/M2 | WEIGHT: 284.9 LBS | SYSTOLIC BLOOD PRESSURE: 140 MMHG | DIASTOLIC BLOOD PRESSURE: 86 MMHG | HEIGHT: 62 IN

## 2019-10-17 DIAGNOSIS — D50.9 IRON DEFICIENCY ANEMIA, UNSPECIFIED IRON DEFICIENCY ANEMIA TYPE: ICD-10-CM

## 2019-10-17 DIAGNOSIS — K21.9 GASTROESOPHAGEAL REFLUX DISEASE, ESOPHAGITIS PRESENCE NOT SPECIFIED: ICD-10-CM

## 2019-10-17 DIAGNOSIS — R19.5 POSITIVE COLORECTAL CANCER SCREENING USING COLOGUARD TEST: Primary | ICD-10-CM

## 2019-10-17 PROCEDURE — 99204 OFFICE O/P NEW MOD 45 MIN: CPT | Performed by: INTERNAL MEDICINE

## 2019-10-17 NOTE — PROGRESS NOTES
Chief Complaint   Patient presents with   • Positive Cologuard   • Heartburn     Evy Talley is a 64 y.o. female who presents with a history of iron deficiency and positive Cologuard testing  HPI     Patient 64-year-old female with history of hyperlipidemia, hypertension, diabetes, hypothyroid and ovarian cancer who presents with iron deficiency.  Patient noted with Cologuard testing positive referred for further evaluation.  Patient does report epigastric burning as well as a history of GERD.  Patient reports Nexium does seem to help but still gets breakthrough particularly if she takes her NSAIDs.  Patient denies any nausea vomiting no fever or chills no weight loss.  Patient here for further recommendations.    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 PT TO STOP PER MD INSTRUCTION, Disp: , Rfl:   •  atorvastatin (LIPITOR) 20 MG tablet, TAKE 1 TABLET BY MOUTH AT BEDTIME , Disp: 90 tablet, Rfl: 1  •  B Complex Vitamins (VITAMIN-B COMPLEX PO), Take 1 tablet by mouth daily., Disp: , Rfl:   •  Biotin (BIOTIN MAXIMUM STRENGTH) 10 MG tablet, Take 1 capsule by mouth daily., Disp: , Rfl:   •  Calcium Carb-Cholecalciferol (CALCIUM 600+D3 PO), Take 1 tablet by mouth daily., Disp: , Rfl:   •  cyclobenzaprine (FLEXERIL) 10 MG tablet, Take 1 tablet by mouth 2 (two) times a day as needed., Disp: , Rfl:   •  Dapagliflozin Propanediol (FARXIGA) 10 MG tablet, Take 1 tablet by mouth Every Morning.,  Disp: , Rfl:   •  Esomeprazole Magnesium (NEXIUM 24HR) 20 MG tablet delayed-release, Take 1 capsule by mouth Daily., Disp: , Rfl:   •  KLS ALLERCLEAR D-12HR 5-120 MG per 12 hr tablet, TAKE ONE TABLET BY MOUTH ONE TIME DAILY , Disp: 60 tablet, Rfl: 1  •  levothyroxine (SYNTHROID, LEVOTHROID) 100 MCG tablet, Take 1 tablet by mouth Daily., Disp: 90 tablet, Rfl: 1  •  losartan (COZAAR) 100 MG tablet, TAKE 1 TABLET BY MOUTH ONCE DAILY, Disp: 90 tablet, Rfl: 1  •  metFORMIN (GLUCOPHAGE) 500 MG tablet, Take 500 mg by mouth 2 (Two) Times a Day With Meals., Disp: , Rfl:   •  metFORMIN (GLUCOPHAGE) 500 MG tablet, TAKE 1 TABLET BY MOUTH TWICE DAILY WITH MEALS, Disp: 180 tablet, Rfl: 1  •  Multiple Vitamin (MULTI VITAMIN DAILY PO), Take 1 tablet by mouth daily., Disp: , Rfl:   •  naproxen (NAPROSYN) 500 MG tablet, Take 500 mg by mouth 2 (Two) Times a Day., Disp: , Rfl: 0  •  Omega-3 Fatty Acids (FISH OIL) 1000 MG capsule capsule, Take 1,000 mg by mouth Daily With Breakfast. PT TO STOP PER MD INSTRUCTION, Disp: , Rfl:   •  sertraline (ZOLOFT) 100 MG tablet, Take 100 mg by mouth Daily., Disp: , Rfl:   •  ferrous sulfate 325 (65 FE) MG tablet, Take 1 tablet by mouth Daily With Breakfast., Disp: 30 tablet, Rfl: 3  Allergies   Allergen Reactions   • Influenza Vaccines Other (See Comments)     FAMILIAL REACTION     Social History     Socioeconomic History   • Marital status: Single     Spouse name: Not on file   • Number of children: 3   • Years of education: College   • Highest education level: Not on file   Occupational History   • Occupation: Legal nurse consultant     Employer: [employer]   Tobacco Use   • Smoking status: Former Smoker     Packs/day: 0.75     Years: 4.00     Pack years: 3.00     Last attempt to quit: 1976     Years since quittin.7   • Smokeless tobacco: Never Used   Substance and Sexual Activity   • Alcohol use: No   • Drug use: No   • Sexual activity: Defer     Family  History   Problem Relation Age of Onset   • Transient ischemic attack Mother    • Aneurysm Mother    • Hypertension Mother    • Lung cancer Father    • Heart disease Father    • Hypertension Father    • Sjogren's syndrome Sister    • Diabetes Maternal Grandmother    • Esophageal cancer Maternal Uncle    • Esophageal cancer Maternal Uncle    • Esophageal cancer Maternal Uncle    • Malig Hyperthermia Neg Hx      Review of Systems   Constitutional: Negative.    HENT: Negative.    Eyes: Negative.    Respiratory: Negative.    Cardiovascular: Negative.    Gastrointestinal: Positive for abdominal pain. Negative for abdominal distention, anal bleeding, blood in stool, constipation, diarrhea, nausea, rectal pain and vomiting.   Endocrine: Negative.    Musculoskeletal: Positive for arthralgias and joint swelling. Negative for back pain, gait problem, myalgias, neck pain and neck stiffness.   Skin: Negative.    Allergic/Immunologic: Negative.    Hematological: Negative.      Vitals:    10/17/19 0900   BP: 140/86     Physical Exam   Constitutional: She is oriented to person, place, and time. She appears well-developed and well-nourished.   HENT:   Head: Normocephalic and atraumatic.   Eyes: Pupils are equal, round, and reactive to light. No scleral icterus.   Neck: Normal range of motion.   Cardiovascular: Normal rate, regular rhythm and normal heart sounds. Exam reveals no friction rub.   No murmur heard.  Pulmonary/Chest: Effort normal and breath sounds normal. She has no wheezes. She has no rales.   Abdominal: Soft. Bowel sounds are normal. She exhibits no shifting dullness, no distension, no pulsatile liver, no fluid wave, no abdominal bruit, no ascites, no pulsatile midline mass and no mass. There is no hepatosplenomegaly. There is no tenderness. There is no rigidity and no guarding. No hernia.   Musculoskeletal: Normal range of motion. She exhibits no edema.   Lymphadenopathy:     She has no cervical adenopathy.    Neurological: She is alert and oriented to person, place, and time. No cranial nerve deficit.   Skin: Skin is warm and dry. No rash noted.   Psychiatric: She has a normal mood and affect. Her behavior is normal.   Nursing note and vitals reviewed.    Diagnoses and all orders for this visit:    Positive colorectal cancer screening using Cologuard test  -     Case Request; Standing  -     Implement Anesthesia orders day of procedure.; Standing  -     Obtain informed consent; Standing  -     Case Request    Iron deficiency anemia, unspecified iron deficiency anemia type  -     Case Request; Standing  -     Implement Anesthesia orders day of procedure.; Standing  -     Obtain informed consent; Standing  -     Case Request    Gastroesophageal reflux disease, esophagitis presence not specified  -     Case Request; Standing  -     Implement Anesthesia orders day of procedure.; Standing  -     Obtain informed consent; Standing  -     Case Request    Patient 64-year-old female with history of osteoarthritis, diabetes, hypertension, hyperlipidemia, hypothyroid and ovarian carcinoid who presents with iron deficiency.  Patient being followed by hematology for leukocytosis noted with low iron levels.  Patient currently not anemic but waiting transfusion.  Patient did have Cologuard testing which was positive referred for evaluation.  Due to iron deficiency would recommend both upper and colon pending results consider further evaluation with small bowel capsule.  Patient to get iron infusion next week.

## 2019-10-22 ENCOUNTER — HOSPITAL ENCOUNTER (OUTPATIENT)
Dept: CT IMAGING | Facility: HOSPITAL | Age: 64
Discharge: HOME | End: 2019-10-22
Admitting: INTERNAL MEDICINE

## 2019-10-22 ENCOUNTER — INFUSION (OUTPATIENT)
Dept: ONCOLOGY | Facility: HOSPITAL | Age: 64
End: 2019-10-22

## 2019-10-22 VITALS
SYSTOLIC BLOOD PRESSURE: 141 MMHG | OXYGEN SATURATION: 97 % | DIASTOLIC BLOOD PRESSURE: 68 MMHG | RESPIRATION RATE: 18 BRPM | WEIGHT: 287.6 LBS | BODY MASS INDEX: 52.6 KG/M2 | TEMPERATURE: 97.9 F | HEART RATE: 87 BPM

## 2019-10-22 DIAGNOSIS — IMO0001 ADVERSE EFFECT OF IRON OR ITS COMPOUND, SUBSEQUENT ENCOUNTER: Primary | ICD-10-CM

## 2019-10-22 DIAGNOSIS — D72.829 LEUKOCYTOSIS, UNSPECIFIED TYPE: ICD-10-CM

## 2019-10-22 DIAGNOSIS — D50.0 IRON DEFICIENCY ANEMIA DUE TO CHRONIC BLOOD LOSS: ICD-10-CM

## 2019-10-22 LAB — CREAT BLDA-MCNC: 0.8 MG/DL (ref 0.6–1.3)

## 2019-10-22 PROCEDURE — 63710000001 PROCHLORPERAZINE MALEATE PER 5 MG: Performed by: INTERNAL MEDICINE

## 2019-10-22 PROCEDURE — 25010000002 IOPAMIDOL 61 % SOLUTION: Performed by: INTERNAL MEDICINE

## 2019-10-22 PROCEDURE — 82565 ASSAY OF CREATININE: CPT

## 2019-10-22 PROCEDURE — 74177 CT ABD & PELVIS W/CONTRAST: CPT

## 2019-10-22 PROCEDURE — 25010000002 FERRIC CARBOXYMALTOSE 750 MG/15ML SOLUTION 15 ML VIAL: Performed by: INTERNAL MEDICINE

## 2019-10-22 PROCEDURE — 96374 THER/PROPH/DIAG INJ IV PUSH: CPT | Performed by: INTERNAL MEDICINE

## 2019-10-22 RX ORDER — PROCHLORPERAZINE MALEATE 10 MG
10 TABLET ORAL ONCE
Status: COMPLETED | OUTPATIENT
Start: 2019-10-22 | End: 2019-10-22

## 2019-10-22 RX ORDER — SODIUM CHLORIDE 9 MG/ML
250 INJECTION, SOLUTION INTRAVENOUS ONCE
Status: COMPLETED | OUTPATIENT
Start: 2019-10-22 | End: 2019-10-22

## 2019-10-22 RX ADMIN — FERRIC CARBOXYMALTOSE INJECTION 750 MG: 50 INJECTION, SOLUTION INTRAVENOUS at 11:19

## 2019-10-22 RX ADMIN — SODIUM CHLORIDE 250 ML: 900 INJECTION, SOLUTION INTRAVENOUS at 11:00

## 2019-10-22 RX ADMIN — PROCHLORPERAZINE MALEATE 10 MG: 5 TABLET ORAL at 11:16

## 2019-10-22 RX ADMIN — IOPAMIDOL 85 ML: 612 INJECTION, SOLUTION INTRAVENOUS at 10:42

## 2019-10-24 ENCOUNTER — DOCUMENTATION (OUTPATIENT)
Dept: ONCOLOGY | Facility: CLINIC | Age: 64
End: 2019-10-24

## 2019-11-01 NOTE — PROGRESS NOTES
Subjective Patient indicating that she is feeling relatively good those  does describe joint discomfort and continued fatigue.    History of Present Illness      The patient 64-year-old female followed by primary care with hypertension, hyperlipidemia and GE reflux as well as hypothyroidism.  She more recently had injured her left shoulder with dislocation January 2019 and is reviewed by orthopedics with a left anterior glenohumeral joint dislocation and radial nerve palsy.  She was seen back in late January by primary care at which time it was also recognized that she has had a degree of leukocytosis documented per medical record at least since September 2015.  These are reviewed as consistent with normal differentials though associated with a degree of thrombocytosis and microcytic, hypochromic indices per her RBCs.  This includes January 2019 with H&H of 12.0 and 39.4, white count of 14,280, again normal automated differential and platelet count of 524,000 and repeat April 30 H&H 12.0 and 42.1 white count 15,300, normal automated differential and platelet count of 565,000.  She did proceed with left shoulder arthroscopy with rotator cuff and labral debridement, left shoulder biceps tenotomy and rotator cuff repair.  She had recent orthopedic assessment follow-up of her previous left knee replacement and now presents for assessment of her leukocytosis.  We discussed her history in detail consultation June 20, 2019 including her diabetes, obesity, thyroid dysfunction, osteoarthritis and surgeries and previous ovarian carcinoid.  Symptomatically she does have a hand pain that worsens particularly in the a.m  and improves throughout the day as well as degree of back pain.  She is otherwise without fever, chills, night sweats or generalized symptoms but does note routine dermatologic follow-up needs.     Upon consultation June 20, 2019 and series of studies were done including iron status reviewing iron deficiency  with iron of 37, TIBC 421, 9% saturation, ferritin 25.8, CRP of 0.42, MMA of 141, B12 87, protein which pheresis without monoclonal spike, sed rate of 31, rheumatoid factor less than 10, RNP less than 0.2, Curry antibodies less than 0.2, CCP antibodies of 32 which is elevated.  These findings were discussed in detail with patient who indicates that she knew she had some degree of rheumatoid symptoms and that previously she been on iron earlier in her life for iron deficiency.  She has had colonoscopy done 2014 by Dr. Tejeda and, at this point, she is loath to repeat the examination.  She will undergo Cologard and I will notify Dr. Pastor.  Her general performance status remains excellent.  The patient is seen back October 15, 2019 and is having increasing fatigue and, indeed, has been found to have a positive Cologuard.  She is scheduled to see GI medicine in the next week and will likely undergo upper and lower endoscopy.  We have further discussed her positive, consistent findings of leukocytosis as well as her relative intolerance to oral iron all of which leads to additional diagnostics with a follow-up CT and the offering of intravenous iron.     The patient was given IV Injectafer and underwent CT scan of the abdomen that was obtained October 22.  This revealed only diverticulosis without diverticulitis, normal spleen and no lymphadenopathy or other acute abnormality.  Patient is also been seen by GI medicine-Dr. Parisi-October 17 and endoscopy is now scheduled.  As she is seen November 7 she feels improved from previous with normalization of her hemoglobin hematocrit and improvement of her microcytosis.  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   • Rotator cuff tear         Past Surgical History:   Procedure Laterality Date   • APPENDECTOMY     • CARPAL TUNNEL RELEASE Left    • CHOLECYSTECTOMY     • COLONOSCOPY  2014   • CYSTOSCOPY W/ LASER LITHOTRIPSY     • HYSTERECTOMY     • KNEE ARTHROPLASTY Left     Replaced   • OOPHORECTOMY      Carcinoid of left ovary   • OTHER SURGICAL HISTORY      RESECTION OF OMENTUM   • PILONIDAL CYSTECTOMY     • SHOULDER ARTHROSCOPY W/ ROTATOR CUFF REPAIR Left 1/30/2019    Procedure: LEFT SHOULDER ARTHROSCOPY WITH ACROMIOPLASTY,LABRAL AND ROTATOR CUFF DEBRIDEMENT AND BICEPS TENOTOMY;  Surgeon: Ky Crum MD;  Location: [facility] OR [facility] [place];  Service: Orthopedics   • SHOULDER MANIPULATION     • TUBAL ABDOMINAL LIGATION     • UPPER GASTROINTESTINAL ENDOSCOPY  2014        Current Outpatient Medications on File Prior to Visit   Medication Sig Dispense Refill   • aspirin 81 MG tablet Take 1 tablet by mouth Daily. PT TO STOP PER MD INSTRUCTION     • atorvastatin (LIPITOR) 20 MG tablet TAKE 1 TABLET BY MOUTH AT BEDTIME  90 tablet 1   • B Complex Vitamins (VITAMIN-B COMPLEX PO) Take 1 tablet by mouth daily.     • Biotin (BIOTIN MAXIMUM STRENGTH) 10 MG tablet Take 1 capsule by mouth daily.     • Calcium Carb-Cholecalciferol (CALCIUM 600+D3 PO) Take 1 tablet by mouth daily.     • cyclobenzaprine (FLEXERIL) 10 MG tablet Take 1 tablet by mouth 2 (two) times a day as needed.     • Dapagliflozin Propanediol (FARXIGA) 10 MG tablet Take 1 tablet by mouth Every Morning.     • dexlansoprazole (DEXILANT) 30 MG capsule Take 30 mg by mouth Daily.     • ferrous sulfate 325 (65 FE) MG tablet Take 1 tablet by mouth Daily With Breakfast. 30 tablet 3   • KLS ALLERCLEAR D-12HR 5-120 MG per 12 hr tablet TAKE ONE TABLET BY MOUTH ONE TIME DAILY  60 tablet 1   • levothyroxine (SYNTHROID, LEVOTHROID) 100 MCG  tablet Take 1 tablet by mouth Daily. 90 tablet 1   • losartan (COZAAR) 100 MG tablet TAKE 1 TABLET BY MOUTH ONCE DAILY 90 tablet 1   • metFORMIN (GLUCOPHAGE) 500 MG tablet Take 500 mg by mouth 2 (Two) Times a Day With Meals.     • metFORMIN (GLUCOPHAGE) 500 MG tablet TAKE 1 TABLET BY MOUTH TWICE DAILY WITH MEALS 180 tablet 1   • Multiple Vitamin (MULTI VITAMIN DAILY PO) Take 1 tablet by mouth daily.     • naproxen (NAPROSYN) 500 MG tablet Take 500 mg by mouth 2 (Two) Times a Day.  0   • Omega-3 Fatty Acids (FISH OIL) 1000 MG capsule capsule Take 1,000 mg by mouth Daily With Breakfast. PT TO STOP PER MD INSTRUCTION     • sertraline (ZOLOFT) 100 MG tablet Take 100 mg by mouth Daily.     • [DISCONTINUED] Esomeprazole Magnesium (NEXIUM 24HR) 20 MG tablet delayed-release Take 1 capsule by mouth Daily.       No current facility-administered medications on file prior to visit.         ALLERGIES:    Allergies   Allergen Reactions   • Influenza Vaccines Other (See Comments)     FAMILIAL REACTION        Social History     Socioeconomic History   • Marital status: Single     Spouse name: Not on file   • Number of children: 3   • Years of education: College   • Highest education level: Not on file   Occupational History   • Occupation: Legal nurse consultant     Employer: [employer]   Tobacco Use   • Smoking status: Former Smoker     Packs/day: 0.75     Years: 4.00     Pack years: 3.00     Last attempt to quit: 1976     Years since quittin.8   • Smokeless tobacco: Never Used   Substance and Sexual Activity   • Alcohol use: No   • Drug use: No   • Sexual activity: Defer        Family History   Problem Relation Age of Onset   • Transient ischemic attack Mother    • Aneurysm Mother    • Hypertension Mother    • Lung cancer Father    • Heart disease Father    • Hypertension Father    • Sjogren's syndrome Sister    • Diabetes Maternal Grandmother    • Esophageal cancer Maternal Uncle    •  "Esophageal cancer Maternal Uncle    • Esophageal cancer Maternal Uncle    • Malig Hyperthermia Neg Hx         Review of Systems   Constitutional: Positive for unexpected weight change. Negative for fatigue.        Ongoing weight gain   Respiratory: Negative for chest tightness, shortness of breath and wheezing.    Gastrointestinal: Negative for abdominal pain, constipation, diarrhea, nausea and vomiting.        Chronic indigestion   Musculoskeletal: Positive for arthralgias, back pain and joint swelling.   Neurological: Negative for weakness.        Objective     Vitals:    11/07/19 0843   BP: 159/98   Pulse: 86   Resp: 20   Temp: 98.1 °F (36.7 °C)   TempSrc: Oral   SpO2: 95%   Weight: 132 kg (292 lb 1.6 oz)   Height: 157.5 cm (62.01\")   PainSc: 0-No pain     Current Status 11/7/2019   ECOG score 0       Physical Exam   Constitutional: She is oriented to person, place, and time. She appears well-developed and well-nourished.   Morbidly obese  female   HENT:   Head: Normocephalic and atraumatic.   Nose: Nose normal.   Mouth/Throat: Oropharynx is clear and moist.   Eyes: Conjunctivae and EOM are normal. Pupils are equal, round, and reactive to light.   Neck: Normal range of motion. Neck supple.   Cardiovascular: Normal rate, regular rhythm, normal heart sounds and intact distal pulses.   Pulmonary/Chest: Effort normal and breath sounds normal.   Abdominal: Soft. Bowel sounds are normal.   Morbidly obese  female   Musculoskeletal: Normal range of motion. She exhibits tenderness.   Neurological: She is alert and oriented to person, place, and time.   Skin: Skin is warm and dry. Rash noted.         RECENT LABS:  Hematology WBC   Date Value Ref Range Status   11/07/2019 13.14 (H) 3.40 - 10.80 10*3/mm3 Final   04/30/2019 15.30 (H) 3.40 - 10.80 10*3/mm3 Final     RBC   Date Value Ref Range Status   11/07/2019 5.22 3.77 - 5.28 10*6/mm3 Final   04/30/2019 5.41 (H) 3.77 - 5.28 10*6/mm3 Final     Hemoglobin "   Date Value Ref Range Status   11/07/2019 13.7 12.0 - 15.9 g/dL Final     Hematocrit   Date Value Ref Range Status   11/07/2019 43.1 34.0 - 46.6 % Final     Platelets   Date Value Ref Range Status   11/07/2019 446 140 - 450 10*3/mm3 Final        CT ABDOMEN AND PELVIS WITH IV CONTRAST 10/22/19    FINDINGS: There is a moderate degree of diverticulosis throughout the  colon without evidence for diverticulitis. No annular lesions are seen.  There is an average volume of formed stool throughout the colon. There  is a tiny hiatal hernia. The liver appears unremarkable other than a 1.7  cm left hepatic lobe cyst. There is no biliary dilatation. Splenic size  is normal. The pancreas, adrenals, and kidneys appear unremarkable other  than 2 right renal cysts measuring 1.7 cm and less than 1 cm. Adnexal  regions appear unremarkable. There are mild-moderate abdominal aortic  atherosclerotic changes without aneurysmal dilatation.     IMPRESSION:  1. There is diverticulosis scattered throughout the colon without  evidence for diverticulitis.  2. Splenic size is normal and there is no lymphadenopathy. No acute  abnormality is seen. Please follow-up as clinically indicated.     This report was finalized on 10/24/2019 8:34 AM by Dr. Aditi Soria M.D.    Assessment/Plan        The patient is a 64-year-old  female followed by primary care again with hypertension, hyperlipidemia, GE reflux and hypothyroidism as well as osteoarthritis with previous knee replacement and more recent left shoulder dislocation.  As she has been followed there is recognition of leukocytosis that has been documented at least since September 2015 but also in records from [facility] in and around her knee replacement in 2014.  In review of all of these peripheral blood counts there is no consistent either right or left shift differential or evidence of immature forms though there is now evidence of microcytic hyperchromic indices per her RBCs.  Review of  her blood smear today demonstrates a leukocytosis with a normal differential, and again no immature forms but also evidence of microcytic hypochromic RBCs and thrombocytosis with otherwise normal platelets per size.  It is suspected that the patient has a reactive leukocytosis and has had this for some time as well as degree of very likely iron deficiency that is compounding her thrombocytosis which itself may also be reactive.  Discussed in the possibilities of an inflammatory condition seems likely and we have discussed a number of laboratory exams directed at this initially.  At present I do not feel the patient needs a bone marrow aspirate and biopsy but will proceed with initial laboratory testing including B12, MMA, sed rate, rheumatoid factor, iron profile, RADHA, PE and free light chains, ferritin, anti-CCP assessment, CMP and a conference of profile.  The patient will then be seen in approximately 4 to 6 weeks for follow-up assessment.  We have also, incidentally, discussed her morbid obesity and treatment approaches potentially for this as well.     The patient is next seen July 23, 2019 and we have documented both serologic evidence of RA as well as ongoing evidence of iron deficiency.  We discussed follow-up colonoscopy but she declines doing this at this point and offered iron replacement therapy at least over the next several months.  She indicates that she would consider Cologard and I plan to contact the referral back to Dr. Pastor to have this done.  The patient went on to have this done and was found to have a positive Cologuard.  This is of course concerning and the patient will need to undergo upper and lower endoscopy but also is offered a CT scan of the abdomen the very least under the circumstances.  She is intolerant to oral iron in a longer conversation today and she is still iron deficient.  After considerable discussion we discontinued oral iron, proceed with IV Injectafer, obtained a CT scan  of abdomen with negative results and obtain GI consultation.  She is seen back November 11 she is normalized for hemoglobin hematocrit, feels some improvement per stamina and is scheduled for endoscopy in the next several weeks to GI medicine.  After further discussion today plan:  *Discontinue oral iron  *GI endoscopy is scheduled  *Return for follow-up in 6 weeks for CBC, iron studies, CMP, hemoglobin A1c, lipid profile, TSH  * MD, Leander Injectafer follow-up in 7 weeks

## 2019-11-07 ENCOUNTER — APPOINTMENT (OUTPATIENT)
Dept: LAB | Facility: HOSPITAL | Age: 64
End: 2019-11-07

## 2019-11-07 ENCOUNTER — OFFICE VISIT (OUTPATIENT)
Dept: ONCOLOGY | Facility: CLINIC | Age: 64
End: 2019-11-07

## 2019-11-07 VITALS
RESPIRATION RATE: 20 BRPM | BODY MASS INDEX: 53.75 KG/M2 | WEIGHT: 292.1 LBS | OXYGEN SATURATION: 95 % | SYSTOLIC BLOOD PRESSURE: 159 MMHG | TEMPERATURE: 98.1 F | HEART RATE: 86 BPM | HEIGHT: 62 IN | DIASTOLIC BLOOD PRESSURE: 98 MMHG

## 2019-11-07 DIAGNOSIS — R19.5 POSITIVE COLORECTAL CANCER SCREENING USING COLOGUARD TEST: ICD-10-CM

## 2019-11-07 DIAGNOSIS — E78.5 HYPERLIPIDEMIA, UNSPECIFIED HYPERLIPIDEMIA TYPE: ICD-10-CM

## 2019-11-07 DIAGNOSIS — E11.9 TYPE 2 DIABETES MELLITUS WITHOUT COMPLICATION, WITHOUT LONG-TERM CURRENT USE OF INSULIN (HCC): ICD-10-CM

## 2019-11-07 DIAGNOSIS — D72.829 LEUKOCYTOSIS, UNSPECIFIED TYPE: Primary | ICD-10-CM

## 2019-11-07 DIAGNOSIS — D50.9 IRON DEFICIENCY ANEMIA, UNSPECIFIED IRON DEFICIENCY ANEMIA TYPE: ICD-10-CM

## 2019-11-07 DIAGNOSIS — E03.9 ACQUIRED HYPOTHYROIDISM: Primary | ICD-10-CM

## 2019-11-07 DIAGNOSIS — K21.9 GASTROESOPHAGEAL REFLUX DISEASE, ESOPHAGITIS PRESENCE NOT SPECIFIED: ICD-10-CM

## 2019-11-07 LAB
BASOPHILS # BLD AUTO: 0.19 10*3/MM3 (ref 0–0.2)
BASOPHILS NFR BLD AUTO: 1.4 % (ref 0–1.5)
DEPRECATED RDW RBC AUTO: 54.4 FL (ref 37–54)
EOSINOPHIL # BLD AUTO: 0.53 10*3/MM3 (ref 0–0.4)
EOSINOPHIL NFR BLD AUTO: 4 % (ref 0.3–6.2)
ERYTHROCYTE [DISTWIDTH] IN BLOOD BY AUTOMATED COUNT: 19.2 % (ref 12.3–15.4)
HCT VFR BLD AUTO: 43.1 % (ref 34–46.6)
HGB BLD-MCNC: 13.7 G/DL (ref 12–15.9)
IMM GRANULOCYTES # BLD AUTO: 0.05 10*3/MM3 (ref 0–0.05)
IMM GRANULOCYTES NFR BLD AUTO: 0.4 % (ref 0–0.5)
LYMPHOCYTES # BLD AUTO: 3.42 10*3/MM3 (ref 0.7–3.1)
LYMPHOCYTES NFR BLD AUTO: 26 % (ref 19.6–45.3)
MCH RBC QN AUTO: 26.2 PG (ref 26.6–33)
MCHC RBC AUTO-ENTMCNC: 31.8 G/DL (ref 31.5–35.7)
MCV RBC AUTO: 82.6 FL (ref 79–97)
MONOCYTES # BLD AUTO: 0.73 10*3/MM3 (ref 0.1–0.9)
MONOCYTES NFR BLD AUTO: 5.6 % (ref 5–12)
NEUTROPHILS # BLD AUTO: 8.22 10*3/MM3 (ref 1.7–7)
NEUTROPHILS NFR BLD AUTO: 62.6 % (ref 42.7–76)
NRBC BLD AUTO-RTO: 0 /100 WBC (ref 0–0.2)
PLATELET # BLD AUTO: 446 10*3/MM3 (ref 140–450)
PMV BLD AUTO: 10 FL (ref 6–12)
RBC # BLD AUTO: 5.22 10*6/MM3 (ref 3.77–5.28)
WBC NRBC COR # BLD: 13.14 10*3/MM3 (ref 3.4–10.8)

## 2019-11-07 PROCEDURE — 85025 COMPLETE CBC W/AUTO DIFF WBC: CPT

## 2019-11-07 PROCEDURE — 36416 COLLJ CAPILLARY BLOOD SPEC: CPT

## 2019-11-07 PROCEDURE — 99214 OFFICE O/P EST MOD 30 MIN: CPT | Performed by: INTERNAL MEDICINE

## 2019-11-07 PROCEDURE — G0463 HOSPITAL OUTPT CLINIC VISIT: HCPCS | Performed by: INTERNAL MEDICINE

## 2019-11-07 RX ORDER — DEXLANSOPRAZOLE 30 MG/1
30 CAPSULE, DELAYED RELEASE ORAL DAILY
COMMUNITY
End: 2021-02-08

## 2019-11-13 ENCOUNTER — ANESTHESIA EVENT (OUTPATIENT)
Dept: GASTROENTEROLOGY | Facility: HOSPITAL | Age: 64
End: 2019-11-13

## 2019-11-13 ENCOUNTER — ANESTHESIA (OUTPATIENT)
Dept: GASTROENTEROLOGY | Facility: HOSPITAL | Age: 64
End: 2019-11-13

## 2019-11-13 ENCOUNTER — HOSPITAL ENCOUNTER (OUTPATIENT)
Facility: HOSPITAL | Age: 64
Setting detail: HOSPITAL OUTPATIENT SURGERY
Discharge: HOME | End: 2019-11-13
Attending: INTERNAL MEDICINE | Admitting: INTERNAL MEDICINE

## 2019-11-13 VITALS
BODY MASS INDEX: 52.15 KG/M2 | HEIGHT: 62 IN | DIASTOLIC BLOOD PRESSURE: 93 MMHG | TEMPERATURE: 98.3 F | HEART RATE: 68 BPM | SYSTOLIC BLOOD PRESSURE: 150 MMHG | WEIGHT: 283.4 LBS | OXYGEN SATURATION: 98 % | RESPIRATION RATE: 16 BRPM

## 2019-11-13 DIAGNOSIS — K21.9 GASTROESOPHAGEAL REFLUX DISEASE, ESOPHAGITIS PRESENCE NOT SPECIFIED: ICD-10-CM

## 2019-11-13 DIAGNOSIS — D50.9 IRON DEFICIENCY ANEMIA, UNSPECIFIED IRON DEFICIENCY ANEMIA TYPE: ICD-10-CM

## 2019-11-13 DIAGNOSIS — R19.5 POSITIVE COLORECTAL CANCER SCREENING USING COLOGUARD TEST: ICD-10-CM

## 2019-11-13 LAB — GLUCOSE BLDC GLUCOMTR-MCNC: 103 MG/DL (ref 70–130)

## 2019-11-13 PROCEDURE — 45385 COLONOSCOPY W/LESION REMOVAL: CPT | Performed by: INTERNAL MEDICINE

## 2019-11-13 PROCEDURE — 82962 GLUCOSE BLOOD TEST: CPT

## 2019-11-13 PROCEDURE — 25010000002 PROPOFOL 10 MG/ML EMULSION: Performed by: ANESTHESIOLOGY

## 2019-11-13 PROCEDURE — 45380 COLONOSCOPY AND BIOPSY: CPT | Performed by: INTERNAL MEDICINE

## 2019-11-13 PROCEDURE — 43239 EGD BIOPSY SINGLE/MULTIPLE: CPT | Performed by: INTERNAL MEDICINE

## 2019-11-13 PROCEDURE — 88305 TISSUE EXAM BY PATHOLOGIST: CPT | Performed by: INTERNAL MEDICINE

## 2019-11-13 RX ORDER — SODIUM CHLORIDE, SODIUM LACTATE, POTASSIUM CHLORIDE, CALCIUM CHLORIDE 600; 310; 30; 20 MG/100ML; MG/100ML; MG/100ML; MG/100ML
30 INJECTION, SOLUTION INTRAVENOUS CONTINUOUS PRN
Status: DISCONTINUED | OUTPATIENT
Start: 2019-11-13 | End: 2019-11-13 | Stop reason: HOSPADM

## 2019-11-13 RX ORDER — PROMETHAZINE HYDROCHLORIDE 25 MG/ML
12.5 INJECTION, SOLUTION INTRAMUSCULAR; INTRAVENOUS ONCE AS NEEDED
Status: DISCONTINUED | OUTPATIENT
Start: 2019-11-13 | End: 2019-11-13 | Stop reason: HOSPADM

## 2019-11-13 RX ORDER — SODIUM CHLORIDE 0.9 % (FLUSH) 0.9 %
10 SYRINGE (ML) INJECTION AS NEEDED
Status: DISCONTINUED | OUTPATIENT
Start: 2019-11-13 | End: 2019-11-13 | Stop reason: HOSPADM

## 2019-11-13 RX ORDER — PROMETHAZINE HYDROCHLORIDE 25 MG/1
25 SUPPOSITORY RECTAL ONCE AS NEEDED
Status: DISCONTINUED | OUTPATIENT
Start: 2019-11-13 | End: 2019-11-13 | Stop reason: HOSPADM

## 2019-11-13 RX ORDER — SODIUM CHLORIDE 0.9 % (FLUSH) 0.9 %
3 SYRINGE (ML) INJECTION EVERY 12 HOURS SCHEDULED
Status: DISCONTINUED | OUTPATIENT
Start: 2019-11-13 | End: 2019-11-13 | Stop reason: HOSPADM

## 2019-11-13 RX ORDER — PROPOFOL 10 MG/ML
VIAL (ML) INTRAVENOUS CONTINUOUS PRN
Status: DISCONTINUED | OUTPATIENT
Start: 2019-11-13 | End: 2019-11-13 | Stop reason: SURG

## 2019-11-13 RX ORDER — PROMETHAZINE HYDROCHLORIDE 25 MG/1
25 TABLET ORAL ONCE AS NEEDED
Status: DISCONTINUED | OUTPATIENT
Start: 2019-11-13 | End: 2019-11-13 | Stop reason: HOSPADM

## 2019-11-13 RX ADMIN — SODIUM CHLORIDE, POTASSIUM CHLORIDE, SODIUM LACTATE AND CALCIUM CHLORIDE: 600; 310; 30; 20 INJECTION, SOLUTION INTRAVENOUS at 09:40

## 2019-11-13 RX ADMIN — PROPOFOL 200 MCG/KG/MIN: 10 INJECTION, EMULSION INTRAVENOUS at 09:40

## 2019-11-13 NOTE — ANESTHESIA POSTPROCEDURE EVALUATION
Patient: Evy Talley    Procedure Summary     Date:  11/13/19 Room / Location:  [facility] ENDOSCOPY 8 /  [facility]    Anesthesia Start:  0938 Anesthesia Stop:  1018    Procedures:       COLONOSCOPY TO CECUM WITH POLYPECTOMY ( COLD BX/HOT SNARE) (N/A )      ESOPHAGOGASTRODUODENOSCOPY WITH BIOPSY (N/A Esophagus) Diagnosis:       Positive colorectal cancer screening using Cologuard test      Iron deficiency anemia, unspecified iron deficiency anemia type      Gastroesophageal reflux disease, esophagitis presence not specified      Colon polyps      Diverticulosis      Internal hemorrhoids without complication      Hiatal hernia      (Positive colorectal cancer screening using Cologuard test [R19.5])      (Iron deficiency anemia, unspecified iron deficiency anemia type [D50.9])      (Gastroesophageal reflux disease, esophagitis presence not specified [K21.9])    Surgeon:  Kwabena Parisi MD Provider:  Felton Mendieta MD    Anesthesia Type:  general ASA Status:  3          Anesthesia Type: general  Last vitals  BP   156/85 (11/13/19 0916)   Temp   36.8 °C (98.3 °F) (11/13/19 0916)   Pulse   75 (11/13/19 0916)   Resp   10 (11/13/19 0916)     SpO2   96 % (11/13/19 0916)     Post Anesthesia Care and Evaluation    Patient location during evaluation: bedside  Level of consciousness: sleepy but conscious  Pain management: adequate  Airway patency: patent  Anesthetic complications: No anesthetic complications    Cardiovascular status: acceptable  Respiratory status: acceptable  Hydration status: acceptable

## 2019-11-13 NOTE — BRIEF OP NOTE
COLONOSCOPY, ESOPHAGOGASTRODUODENOSCOPY  Progress Note    Evy Talley  11/13/2019    Pre-op Diagnosis:   Positive colorectal cancer screening using Cologuard test [R19.5]  Iron deficiency anemia, unspecified iron deficiency anemia type [D50.9]  Gastroesophageal reflux disease, esophagitis presence not specified [K21.9]       Post-Op Diagnosis Codes:     * Positive colorectal cancer screening using Cologuard test [R19.5]     * Iron deficiency anemia, unspecified iron deficiency anemia type [D50.9]     * Gastroesophageal reflux disease, esophagitis presence not specified [K21.9]     * Colon polyps [K63.5]     * Diverticulosis [K57.90]     * Internal hemorrhoids without complication [K64.8]     * Hiatal hernia [K44.9]    Procedure/CPT® Codes:      Procedure(s):  COLONOSCOPY TO CECUM WITH POLYPECTOMY ( COLD BX/HOT SNARE)  ESOPHAGOGASTRODUODENOSCOPY WITH BIOPSY    Surgeon(s):  Kwabena Parisi MD    Anesthesia: Monitored Anesthesia Care    Staff:   Endo Technician: Alexandra Gould  Endo Nurse: Monica Downs RN    Estimated Blood Loss: minimal    Urine Voided: * No values recorded between 11/13/2019  9:37 AM and 11/13/2019 10:18 AM *    Specimens:                Specimens     ID Source Type Tests Collected By Collected At Frozen?      A Large Intestine, Rectum Polyp · TISSUE PATHOLOGY EXAM   Kwabena Parisi MD 11/13/19 1001 No     Description: RECTAL POLYPS X2 ( COLD BX/HOT SNARE)    B Small Intestine Tissue · TISSUE PATHOLOGY EXAM   Kwabena Parisi MD 11/13/19 1012 No     Description: DUODENAL BX R/O CELIAC                Drains:      Findings: Colonoscopy to the cecum and ileocecal valve with sigmoid diverticulosis and colon polyps x2 in the rectum 1 removed cold biopsy 1 removed hot snare.  Internal hemorrhoids noted as well.  EGD with normal esophageal mucosa throughout with a medium sized hiatal hernia and normal gastric mucosa.  Duodenal lining was normal biopsies obtained to rule out  celiac.    Complications: None      Kwabena Parisi MD     Date: 11/13/2019  Time: 10:18 AM

## 2019-11-13 NOTE — ANESTHESIA PREPROCEDURE EVALUATION
Anesthesia Evaluation     NPO Solid Status: > 8 hours             Airway   Mallampati: II  TM distance: >3 FB  Neck ROM: full  Dental    (+) implants    Pulmonary - normal exam   Cardiovascular - normal exam    (+) hypertension, hyperlipidemia,       Neuro/Psych  GI/Hepatic/Renal/Endo    (+) morbid obesity, GERD,  diabetes mellitus,     Musculoskeletal     Abdominal    Substance History      OB/GYN          Other                        Anesthesia Plan    ASA 3     general       Anesthetic plan, all risks, benefits, and alternatives have been provided, discussed and informed consent has been obtained with: patient.

## 2019-11-14 LAB
CYTO UR: NORMAL
LAB AP CASE REPORT: NORMAL
PATH REPORT.FINAL DX SPEC: NORMAL
PATH REPORT.GROSS SPEC: NORMAL

## 2019-11-18 ENCOUNTER — HOSPITAL ENCOUNTER (OUTPATIENT)
Dept: MAMMOGRAPHY | Facility: HOSPITAL | Age: 64
Discharge: HOME | End: 2019-11-18
Admitting: INTERNAL MEDICINE

## 2019-11-18 DIAGNOSIS — Z12.31 ENCOUNTER FOR SCREENING MAMMOGRAM FOR MALIGNANT NEOPLASM OF BREAST: ICD-10-CM

## 2019-11-18 PROCEDURE — 77063 BREAST TOMOSYNTHESIS BI: CPT

## 2019-11-18 PROCEDURE — 77067 SCR MAMMO BI INCL CAD: CPT

## 2019-12-10 RX ORDER — ATORVASTATIN CALCIUM 20 MG/1
TABLET, FILM COATED ORAL
Qty: 90 TABLET | Refills: 1 | Status: SHIPPED | OUTPATIENT
Start: 2019-12-10 | End: 2021-06-22 | Stop reason: SDUPTHER

## 2019-12-18 ENCOUNTER — TELEPHONE (OUTPATIENT)
Dept: GASTROENTEROLOGY | Facility: CLINIC | Age: 64
End: 2019-12-18

## 2019-12-18 NOTE — TELEPHONE ENCOUNTER
----- Message from Kwabena Parisi MD sent at 12/15/2019  3:22 PM EST -----  Benign polyps, repeat colon 5 years, let us know if gerd not improved can change ppi

## 2019-12-18 NOTE — TELEPHONE ENCOUNTER
Repeat c/s in 5 yrs added to HM. 11/13/24.   Patient called, advised as per Dr. Parisi's note. She states Dexilant did not help with her discomfort. So she purchased OTC Nexium and it is working well. Advised should she develop break through pain to call the office. She verb understanding.

## 2019-12-23 RX ORDER — LOSARTAN POTASSIUM 100 MG/1
TABLET ORAL
Qty: 90 TABLET | Refills: 1 | Status: SHIPPED | OUTPATIENT
Start: 2019-12-23 | End: 2020-07-20

## 2019-12-23 RX ORDER — SERTRALINE HYDROCHLORIDE 100 MG/1
TABLET, FILM COATED ORAL
Qty: 90 TABLET | Refills: 1 | Status: SHIPPED | OUTPATIENT
Start: 2019-12-23 | End: 2020-12-10

## 2019-12-23 RX ORDER — LEVOTHYROXINE SODIUM 0.1 MG/1
TABLET ORAL
Qty: 90 TABLET | Refills: 1 | Status: SHIPPED | OUTPATIENT
Start: 2019-12-23 | End: 2020-06-15

## 2019-12-30 ENCOUNTER — LAB (OUTPATIENT)
Dept: OTHER | Facility: HOSPITAL | Age: 64
End: 2019-12-30

## 2019-12-30 DIAGNOSIS — E03.9 ACQUIRED HYPOTHYROIDISM: ICD-10-CM

## 2019-12-30 DIAGNOSIS — D50.9 IRON DEFICIENCY ANEMIA, UNSPECIFIED IRON DEFICIENCY ANEMIA TYPE: ICD-10-CM

## 2019-12-30 DIAGNOSIS — E78.5 HYPERLIPIDEMIA, UNSPECIFIED HYPERLIPIDEMIA TYPE: ICD-10-CM

## 2019-12-30 DIAGNOSIS — E11.9 TYPE 2 DIABETES MELLITUS WITHOUT COMPLICATION, WITHOUT LONG-TERM CURRENT USE OF INSULIN (HCC): ICD-10-CM

## 2019-12-30 LAB
ALBUMIN SERPL-MCNC: 4.3 G/DL (ref 3.5–5.2)
ALBUMIN/GLOB SERPL: 1.2 G/DL
ALP SERPL-CCNC: 101 U/L (ref 39–117)
ALT SERPL W P-5'-P-CCNC: 34 U/L (ref 1–33)
ANION GAP SERPL CALCULATED.3IONS-SCNC: 13.4 MMOL/L (ref 5–15)
AST SERPL-CCNC: 29 U/L (ref 1–32)
BASOPHILS # BLD AUTO: 0.15 10*3/MM3 (ref 0–0.2)
BASOPHILS NFR BLD AUTO: 1.1 % (ref 0–1.5)
BILIRUB SERPL-MCNC: 0.4 MG/DL (ref 0.1–1.2)
BUN BLD-MCNC: 14 MG/DL (ref 8–23)
BUN/CREAT SERPL: 16.9 (ref 7–25)
CALCIUM SPEC-SCNC: 10.1 MG/DL (ref 8.6–10.5)
CHLORIDE SERPL-SCNC: 103 MMOL/L (ref 98–107)
CHOLEST SERPL-MCNC: 178 MG/DL (ref 0–200)
CO2 SERPL-SCNC: 22.6 MMOL/L (ref 22–29)
CREAT BLD-MCNC: 0.83 MG/DL (ref 0.57–1)
DEPRECATED RDW RBC AUTO: 50.1 FL (ref 37–54)
EOSINOPHIL # BLD AUTO: 0.26 10*3/MM3 (ref 0–0.4)
EOSINOPHIL NFR BLD AUTO: 2 % (ref 0.3–6.2)
ERYTHROCYTE [DISTWIDTH] IN BLOOD BY AUTOMATED COUNT: 16.3 % (ref 12.3–15.4)
FERRITIN SERPL-MCNC: 252.6 NG/ML (ref 13–150)
GFR SERPL CREATININE-BSD FRML MDRD: 69 ML/MIN/1.73
GLOBULIN UR ELPH-MCNC: 3.6 GM/DL
GLUCOSE BLD-MCNC: 124 MG/DL (ref 65–99)
HBA1C MFR BLD: 6.5 % (ref 4.8–5.6)
HCT VFR BLD AUTO: 44 % (ref 34–46.6)
HDLC SERPL-MCNC: 50 MG/DL (ref 40–60)
HGB BLD-MCNC: 14.1 G/DL (ref 12–15.9)
IMM GRANULOCYTES # BLD AUTO: 0.07 10*3/MM3 (ref 0–0.05)
IMM GRANULOCYTES NFR BLD AUTO: 0.5 % (ref 0–0.5)
IRON 24H UR-MRATE: 62 MCG/DL (ref 37–145)
IRON SATN MFR SERPL: 16 % (ref 20–50)
LDLC SERPL CALC-MCNC: 106 MG/DL (ref 0–100)
LDLC/HDLC SERPL: 2.12 {RATIO}
LYMPHOCYTES # BLD AUTO: 3.01 10*3/MM3 (ref 0.7–3.1)
LYMPHOCYTES NFR BLD AUTO: 22.8 % (ref 19.6–45.3)
MCH RBC QN AUTO: 27 PG (ref 26.6–33)
MCHC RBC AUTO-ENTMCNC: 32 G/DL (ref 31.5–35.7)
MCV RBC AUTO: 84.1 FL (ref 79–97)
MONOCYTES # BLD AUTO: 0.73 10*3/MM3 (ref 0.1–0.9)
MONOCYTES NFR BLD AUTO: 5.5 % (ref 5–12)
NEUTROPHILS # BLD AUTO: 8.97 10*3/MM3 (ref 1.7–7)
NEUTROPHILS NFR BLD AUTO: 68.1 % (ref 42.7–76)
NRBC BLD AUTO-RTO: 0 /100 WBC (ref 0–0.2)
PLATELET # BLD AUTO: 455 10*3/MM3 (ref 140–450)
PMV BLD AUTO: 10 FL (ref 6–12)
POTASSIUM BLD-SCNC: 4.3 MMOL/L (ref 3.5–5.2)
PROT SERPL-MCNC: 7.9 G/DL (ref 6–8.5)
RBC # BLD AUTO: 5.23 10*6/MM3 (ref 3.77–5.28)
SODIUM BLD-SCNC: 139 MMOL/L (ref 136–145)
TIBC SERPL-MCNC: 383 MCG/DL (ref 298–536)
TRANSFERRIN SERPL-MCNC: 257 MG/DL (ref 200–360)
TRIGL SERPL-MCNC: 111 MG/DL (ref 0–150)
TSH SERPL DL<=0.05 MIU/L-ACNC: 4.04 UIU/ML (ref 0.27–4.2)
VLDLC SERPL-MCNC: 22.2 MG/DL (ref 5–40)
WBC NRBC COR # BLD: 13.19 10*3/MM3 (ref 3.4–10.8)

## 2019-12-30 PROCEDURE — 80053 COMPREHEN METABOLIC PANEL: CPT | Performed by: INTERNAL MEDICINE

## 2019-12-30 PROCEDURE — 84466 ASSAY OF TRANSFERRIN: CPT | Performed by: INTERNAL MEDICINE

## 2019-12-30 PROCEDURE — 84443 ASSAY THYROID STIM HORMONE: CPT | Performed by: INTERNAL MEDICINE

## 2019-12-30 PROCEDURE — 36415 COLL VENOUS BLD VENIPUNCTURE: CPT

## 2019-12-30 PROCEDURE — 82728 ASSAY OF FERRITIN: CPT | Performed by: INTERNAL MEDICINE

## 2019-12-30 PROCEDURE — 83036 HEMOGLOBIN GLYCOSYLATED A1C: CPT | Performed by: INTERNAL MEDICINE

## 2019-12-30 PROCEDURE — 85025 COMPLETE CBC W/AUTO DIFF WBC: CPT | Performed by: INTERNAL MEDICINE

## 2019-12-30 PROCEDURE — 80061 LIPID PANEL: CPT | Performed by: INTERNAL MEDICINE

## 2019-12-30 PROCEDURE — 83540 ASSAY OF IRON: CPT | Performed by: INTERNAL MEDICINE

## 2019-12-31 ENCOUNTER — APPOINTMENT (OUTPATIENT)
Dept: OTHER | Facility: HOSPITAL | Age: 64
End: 2019-12-31

## 2020-01-07 NOTE — PROGRESS NOTES
Subjective Patient indicating that she is feeling relatively good those  does describe joint discomfort and continued fatigue.    History of Present Illness      The patient 64-year-old female followed by primary care with hypertension, hyperlipidemia and GE reflux as well as hypothyroidism.  She more recently had injured her left shoulder with dislocation January 2019 and is reviewed by orthopedics with a left anterior glenohumeral joint dislocation and radial nerve palsy.  She was seen back in late January by primary care at which time it was also recognized that she has had a degree of leukocytosis documented per medical record at least since September 2015.  These are reviewed as consistent with normal differentials though associated with a degree of thrombocytosis and microcytic, hypochromic indices per her RBCs.  This includes January 2019 with H&H of 12.0 and 39.4, white count of 14,280, again normal automated differential and platelet count of 524,000 and repeat April 30 H&H 12.0 and 42.1 white count 15,300, normal automated differential and platelet count of 565,000.  She did proceed with left shoulder arthroscopy with rotator cuff and labral debridement, left shoulder biceps tenotomy and rotator cuff repair.  She had recent orthopedic assessment follow-up of her previous left knee replacement and now presents for assessment of her leukocytosis.  We discussed her history in detail consultation June 20, 2019 including her diabetes, obesity, thyroid dysfunction, osteoarthritis and surgeries and previous ovarian carcinoid.  Symptomatically she does have a hand pain that worsens particularly in the a.m  and improves throughout the day as well as degree of back pain.  She is otherwise without fever, chills, night sweats or generalized symptoms but does note routine dermatologic follow-up needs.     Upon consultation June 20, 2019 and series of studies were done including iron status reviewing iron deficiency  with iron of 37, TIBC 421, 9% saturation, ferritin 25.8, CRP of 0.42, MMA of 141, B12 87, protein which pheresis without monoclonal spike, sed rate of 31, rheumatoid factor less than 10, RNP less than 0.2, Curry antibodies less than 0.2, CCP antibodies of 32 which is elevated.  These findings were discussed in detail with patient who indicates that she knew she had some degree of rheumatoid symptoms and that previously she been on iron earlier in her life for iron deficiency.  She has had colonoscopy done 2014 by Dr. Tejeda and, at this point, she is loath to repeat the examination.  She will undergo Cologard and I will notify Dr. Pastor.  Her general performance status remains excellent.  The patient is seen back October 15, 2019 and is having increasing fatigue and, indeed, has been found to have a positive Cologuard.  She is scheduled to see GI medicine in the next week and will likely undergo upper and lower endoscopy.  We have further discussed her positive, consistent findings of leukocytosis as well as her relative intolerance to oral iron all of which leads to additional diagnostics with a follow-up CT and the offering of intravenous iron.     The patient was given IV Injectafer and underwent CT scan of the abdomen that was obtained October 22.  This revealed only diverticulosis without diverticulitis, normal spleen and no lymphadenopathy or other acute abnormality.  Patient is also been seen by GI medicine-Dr. Parisi-October 17 and endoscopy is now scheduled.  As she is seen November 7 she feels improved from previous with normalization of her hemoglobin hematocrit and improvement of her microcytosis.  The patient proceeded to endoscopy and was found to have polyps which were removed and found to be tubular adenomas.  She is next seen January 14, 2020 with studies done December 30 demonstrating mild iron deficiency though no significant anemia.  She continues to have some joint discomfort and this is felt to  be the underlying etiology for her elevated ferritin, as well as leukocytosis, in this setting.  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   • Rotator cuff tear         Past Surgical History:   Procedure Laterality Date   • APPENDECTOMY     • CARPAL TUNNEL RELEASE Left    • CHOLECYSTECTOMY     • COLONOSCOPY  2014   • COLONOSCOPY N/A 11/13/2019    Procedure: COLONOSCOPY TO CECUM WITH POLYPECTOMY ( COLD BX/HOT SNARE);  Surgeon: Kwabena Parisi MD;  Location: [facility] ENDOSCOPY;  Service: Gastroenterology   • CYSTOSCOPY W/ LASER LITHOTRIPSY     • ENDOSCOPY N/A 11/13/2019    Procedure: ESOPHAGOGASTRODUODENOSCOPY WITH BIOPSY;  Surgeon: Kwabena Parisi MD;  Location: [facility] ENDOSCOPY;  Service: Gastroenterology   • HYSTERECTOMY     • KNEE ARTHROPLASTY Left     Replaced   • OOPHORECTOMY      Carcinoid of left ovary   • OTHER SURGICAL HISTORY      RESECTION OF OMENTUM   • PILONIDAL CYSTECTOMY     • SHOULDER ARTHROSCOPY W/ ROTATOR CUFF REPAIR Left 1/30/2019    Procedure: LEFT SHOULDER ARTHROSCOPY WITH ACROMIOPLASTY,LABRAL AND ROTATOR CUFF DEBRIDEMENT AND BICEPS TENOTOMY;  Surgeon: Ky Crum MD;  Location: [facility] OR [facility] [place];  Service: Orthopedics   • SHOULDER MANIPULATION     • TUBAL ABDOMINAL LIGATION     • UPPER GASTROINTESTINAL ENDOSCOPY  2014        Current Outpatient Medications on File Prior to Visit   Medication Sig Dispense Refill   • aspirin 81 MG tablet Take 1 tablet by mouth Daily. PT TO STOP PER MD INSTRUCTION     • atorvastatin (LIPITOR) 20 MG tablet TAKE 1 TABLET BY MOUTH AT BEDTIME  90 tablet 1   • B Complex Vitamins  (VITAMIN-B COMPLEX PO) Take 1 tablet by mouth daily.     • Biotin (BIOTIN MAXIMUM STRENGTH) 10 MG tablet Take 1 capsule by mouth daily.     • Calcium Carb-Cholecalciferol (CALCIUM 600+D3 PO) Take 1 tablet by mouth daily.     • cyclobenzaprine (FLEXERIL) 10 MG tablet Take 1 tablet by mouth 2 (two) times a day as needed.     • Dapagliflozin Propanediol (FARXIGA) 10 MG tablet Take 1 tablet by mouth Every Morning.     • dexlansoprazole (DEXILANT) 30 MG capsule Take 30 mg by mouth Daily.     • esomeprazole (nexIUM) 20 MG capsule Take 20 mg by mouth Every Morning Before Breakfast.     • KLS ALLERCLEAR D-12HR 5-120 MG per 12 hr tablet TAKE ONE TABLET BY MOUTH ONE TIME DAILY  60 tablet 1   • levothyroxine (SYNTHROID, LEVOTHROID) 100 MCG tablet TAKE 1 TABLET BY MOUTH ONCE DAILY 90 tablet 1   • losartan (COZAAR) 100 MG tablet TAKE 1 TABLET BY MOUTH ONCE DAILY 90 tablet 1   • metFORMIN (GLUCOPHAGE) 500 MG tablet Take 500 mg by mouth 2 (Two) Times a Day With Meals.     • Multiple Vitamin (MULTI VITAMIN DAILY PO) Take 1 tablet by mouth daily.     • naproxen (NAPROSYN) 500 MG tablet Take 500 mg by mouth 2 (Two) Times a Day.  0   • Omega-3 Fatty Acids (FISH OIL) 1000 MG capsule capsule Take 1,000 mg by mouth Daily With Breakfast. PT TO STOP PER MD INSTRUCTION     • sertraline (ZOLOFT) 100 MG tablet TAKE 1 TABLET BY MOUTH ONCE DAILY 90 tablet 1     No current facility-administered medications on file prior to visit.         ALLERGIES:    Allergies   Allergen Reactions   • Influenza Vaccines Unknown - Low Severity     FAMILIAL REACTION        Social History     Socioeconomic History   • Marital status: Single     Spouse name: Not on file   • Number of children: 3   • Years of education: College   • Highest education level: Not on file   Occupational History   • Occupation: Legal nurse consultant     Employer: [employer]   Tobacco Use   • Smoking status: Former Smoker     Packs/day: 0.75     Years: 4.00     " Pack years: 3.00     Last attempt to quit: 1976     Years since quittin.0   • Smokeless tobacco: Never Used   Substance and Sexual Activity   • Alcohol use: No   • Drug use: No   • Sexual activity: Defer        Family History   Problem Relation Age of Onset   • Transient ischemic attack Mother    • Aneurysm Mother    • Hypertension Mother    • Lung cancer Father    • Heart disease Father    • Hypertension Father    • Sjogren's syndrome Sister    • Diabetes Maternal Grandmother    • Esophageal cancer Maternal Uncle    • Esophageal cancer Maternal Uncle    • Esophageal cancer Maternal Uncle    • Malig Hyperthermia Neg Hx         Review of Systems   Constitutional: Positive for unexpected weight change. Negative for fatigue.        Ongoing weight gain   Respiratory: Negative for chest tightness, shortness of breath and wheezing.    Gastrointestinal: Negative for abdominal pain, constipation, diarrhea, nausea and vomiting.        Chronic indigestion   Musculoskeletal: Positive for arthralgias, back pain and joint swelling.   Neurological: Negative for weakness.        Objective     Vitals:    20 1004   BP: 149/95   Pulse: 79   Resp: 16   Temp: 98 °F (36.7 °C)   TempSrc: Oral   SpO2: 96%   Weight: 135 kg (296 lb 9.6 oz)   Height: 157.5 cm (62.01\")   PainSc: 0-No pain     Current Status 2020   ECOG score 0       Physical Exam   Constitutional: She is oriented to person, place, and time. She appears well-developed and well-nourished.   Morbidly obese  female   HENT:   Head: Normocephalic and atraumatic.   Nose: Nose normal.   Mouth/Throat: Oropharynx is clear and moist.   Eyes: Pupils are equal, round, and reactive to light. Conjunctivae and EOM are normal.   Neck: Normal range of motion. Neck supple.   Cardiovascular: Normal rate, regular rhythm, normal heart sounds and intact distal pulses.   Pulmonary/Chest: Effort normal and breath sounds normal.   Abdominal: Soft. Bowel sounds are " normal.   Morbidly obese  female   Musculoskeletal: Normal range of motion. She exhibits tenderness.   Neurological: She is alert and oriented to person, place, and time.   Skin: Skin is warm and dry. Rash noted.         RECENT LABS:  Hematology WBC   Date Value Ref Range Status   12/30/2019 13.19 (H) 3.40 - 10.80 10*3/mm3 Final   04/30/2019 15.30 (H) 3.40 - 10.80 10*3/mm3 Final     RBC   Date Value Ref Range Status   12/30/2019 5.23 3.77 - 5.28 10*6/mm3 Final   04/30/2019 5.41 (H) 3.77 - 5.28 10*6/mm3 Final     Hemoglobin   Date Value Ref Range Status   12/30/2019 14.1 12.0 - 15.9 g/dL Final     Hematocrit   Date Value Ref Range Status   12/30/2019 44.0 34.0 - 46.6 % Final     Platelets   Date Value Ref Range Status   12/30/2019 455 (H) 140 - 450 10*3/mm3 Final        CT ABDOMEN AND PELVIS WITH IV CONTRAST 10/22/19    FINDINGS: There is a moderate degree of diverticulosis throughout the  colon without evidence for diverticulitis. No annular lesions are seen.  There is an average volume of formed stool throughout the colon. There  is a tiny hiatal hernia. The liver appears unremarkable other than a 1.7  cm left hepatic lobe cyst. There is no biliary dilatation. Splenic size  is normal. The pancreas, adrenals, and kidneys appear unremarkable other  than 2 right renal cysts measuring 1.7 cm and less than 1 cm. Adnexal  regions appear unremarkable. There are mild-moderate abdominal aortic  atherosclerotic changes without aneurysmal dilatation.     IMPRESSION:  1. There is diverticulosis scattered throughout the colon without  evidence for diverticulitis.  2. Splenic size is normal and there is no lymphadenopathy. No acute  abnormality is seen. Please follow-up as clinically indicated.     This report was finalized on 10/24/2019 8:34 AM by Dr. Aditi Soria M.D.    Assessment/Plan        The patient is a 64-year-old  female followed by primary care again with hypertension, hyperlipidemia, GE reflux and  hypothyroidism as well as osteoarthritis with previous knee replacement and more recent left shoulder dislocation.  As she has been followed there is recognition of leukocytosis that has been documented at least since September 2015 but also in records from [facility] in and around her knee replacement in 2014.  In review of all of these peripheral blood counts there is no consistent either right or left shift differential or evidence of immature forms though there is now evidence of microcytic hyperchromic indices per her RBCs.  Review of her blood smear today demonstrates a leukocytosis with a normal differential, and again no immature forms but also evidence of microcytic hypochromic RBCs and thrombocytosis with otherwise normal platelets per size.  It is suspected that the patient has a reactive leukocytosis and has had this for some time as well as degree of very likely iron deficiency that is compounding her thrombocytosis which itself may also be reactive.  Discussed in the possibilities of an inflammatory condition seems likely and we have discussed a number of laboratory exams directed at this initially.  At present I do not feel the patient needs a bone marrow aspirate and biopsy but will proceed with initial laboratory testing including B12, MMA, sed rate, rheumatoid factor, iron profile, RADHA, PE and free light chains, ferritin, anti-CCP assessment, CMP and a conference of profile.  The patient will then be seen in approximately 4 to 6 weeks for follow-up assessment.  We have also, incidentally, discussed her morbid obesity and treatment approaches potentially for this as well.     The patient is next seen July 23, 2019 and we have documented both serologic evidence of RA as well as ongoing evidence of iron deficiency.  We discussed follow-up colonoscopy but she declines doing this at this point and offered iron replacement therapy at least over the next several months.  She indicates that she would  consider Cologard and I plan to contact the referral back to Dr. Pastor to have this done.  The patient went on to have this done and was found to have a positive Cologuard.  This is of course concerning and the patient will need to undergo upper and lower endoscopy but also is offered a CT scan of the abdomen the very least under the circumstances.  She is intolerant to oral iron in a longer conversation today and she is still iron deficient.  After considerable discussion we discontinued oral iron, proceed with IV Injectafer, obtained a CT scan of abdomen with negative results and obtain GI consultation.  She is seen back November 11 she is normalized for hemoglobin hematocrit, feels some improvement per stamina and is scheduled for endoscopy in the next several weeks to GI medicine.  After further discussion we discontinue oral iron, had a proceed through GI endoscopy with the above results as she is next seen back January 14 finding only mild iron deficiency without anemia.  Additionally her endoscopy revealed polyps without evidence of malignancy.   In a longer discussion January 14 it is felt that her leukocytosis as well as elevated ferritin is related likely to her rheumatologic disorder, likely RA.    *At this point she does not need IV iron    *Continue other medications including Naprosyn on lower dose to reassess her for developing iron deficiency in the next 6 months    *Patient to remain on PPI therapy per GI    *Return 23 weeks for iron studies    *Return 24 weeks for MD and possible IV Injectafer

## 2020-01-14 ENCOUNTER — OFFICE VISIT (OUTPATIENT)
Dept: ONCOLOGY | Facility: CLINIC | Age: 65
End: 2020-01-14

## 2020-01-14 ENCOUNTER — APPOINTMENT (OUTPATIENT)
Dept: ONCOLOGY | Facility: HOSPITAL | Age: 65
End: 2020-01-14

## 2020-01-14 ENCOUNTER — APPOINTMENT (OUTPATIENT)
Dept: OTHER | Facility: HOSPITAL | Age: 65
End: 2020-01-14

## 2020-01-14 VITALS
RESPIRATION RATE: 16 BRPM | HEIGHT: 62 IN | SYSTOLIC BLOOD PRESSURE: 149 MMHG | BODY MASS INDEX: 53.92 KG/M2 | DIASTOLIC BLOOD PRESSURE: 95 MMHG | OXYGEN SATURATION: 96 % | TEMPERATURE: 98 F | WEIGHT: 293 LBS | HEART RATE: 79 BPM

## 2020-01-14 DIAGNOSIS — IMO0001 ADVERSE EFFECT OF IRON OR ITS COMPOUND, SUBSEQUENT ENCOUNTER: ICD-10-CM

## 2020-01-14 DIAGNOSIS — R19.5 POSITIVE COLORECTAL CANCER SCREENING USING COLOGUARD TEST: ICD-10-CM

## 2020-01-14 DIAGNOSIS — D72.829 LEUKOCYTOSIS, UNSPECIFIED TYPE: ICD-10-CM

## 2020-01-14 DIAGNOSIS — D50.0 IRON DEFICIENCY ANEMIA DUE TO CHRONIC BLOOD LOSS: Primary | ICD-10-CM

## 2020-01-14 PROCEDURE — 99214 OFFICE O/P EST MOD 30 MIN: CPT | Performed by: INTERNAL MEDICINE

## 2020-02-05 ENCOUNTER — OFFICE VISIT (OUTPATIENT)
Dept: INTERNAL MEDICINE | Facility: CLINIC | Age: 65
End: 2020-02-05

## 2020-02-05 VITALS
HEART RATE: 80 BPM | SYSTOLIC BLOOD PRESSURE: 144 MMHG | DIASTOLIC BLOOD PRESSURE: 82 MMHG | TEMPERATURE: 98.3 F | BODY MASS INDEX: 53.92 KG/M2 | OXYGEN SATURATION: 98 % | HEIGHT: 62 IN | WEIGHT: 293 LBS

## 2020-02-05 DIAGNOSIS — E11.9 TYPE 2 DIABETES MELLITUS WITHOUT COMPLICATION, WITHOUT LONG-TERM CURRENT USE OF INSULIN (HCC): ICD-10-CM

## 2020-02-05 DIAGNOSIS — E03.9 ACQUIRED HYPOTHYROIDISM: ICD-10-CM

## 2020-02-05 DIAGNOSIS — I10 ESSENTIAL HYPERTENSION: ICD-10-CM

## 2020-02-05 DIAGNOSIS — E78.5 HYPERLIPIDEMIA, UNSPECIFIED HYPERLIPIDEMIA TYPE: ICD-10-CM

## 2020-02-05 DIAGNOSIS — Z00.00 HEALTH CARE MAINTENANCE: Primary | ICD-10-CM

## 2020-02-05 PROBLEM — D50.9 IRON DEFICIENCY ANEMIA: Status: RESOLVED | Noted: 2019-10-17 | Resolved: 2020-02-05

## 2020-02-05 PROCEDURE — 99397 PER PM REEVAL EST PAT 65+ YR: CPT | Performed by: INTERNAL MEDICINE

## 2020-02-05 RX ORDER — NAPROXEN 500 MG/1
TABLET ORAL
COMMUNITY
Start: 2020-01-20 | End: 2021-01-19 | Stop reason: SDUPTHER

## 2020-02-05 NOTE — PROGRESS NOTES
Subjective   Evy Talley is a 64 y.o. female and is here for a comprehensive physical exam. The patient reports problems - htn.  Pt has been taking BP meds as prescribed without any problems.  No HA  No episodes of orthostasis  Pt has been taking cholesterol meds as prescribed.  No difficulties with myalgias.   Pt has been doing well with thyroid meds.  Taking as perscribed without any complications    Pt is UTD with annual gyn exam and mammo utd    Do you take any herbs or supplements that were not prescribed by a doctor? See list      Social History: she does try to exercise     Social History     Socioeconomic History   • Marital status: Single     Spouse name: Not on file   • Number of children: 3   • Years of education: College   • Highest education level: Not on file   Occupational History   • Occupation: Legal nurse consultant     Employer: [employer]   Tobacco Use   • Smoking status: Former Smoker     Packs/day: 0.75     Years: 4.00     Pack years: 3.00     Last attempt to quit: 1976     Years since quittin.0   • Smokeless tobacco: Never Used   Substance and Sexual Activity   • Alcohol use: No   • Drug use: No   • Sexual activity: Defer       Family History:   Family History   Problem Relation Age of Onset   • Transient ischemic attack Mother    • Aneurysm Mother    • Hypertension Mother    • Lung cancer Father    • Heart disease Father    • Hypertension Father    • Sjogren's syndrome Sister    • Diabetes Maternal Grandmother    • Esophageal cancer Maternal Uncle    • Esophageal cancer Maternal Uncle    • Esophageal cancer Maternal Uncle    • Malig Hyperthermia Neg Hx        Past Medical History:   Past Medical History:   Diagnosis Date   • Diabetes mellitus (CMS/HCC)    • Essential hypertension    • GERD (gastroesophageal reflux disease)    • H/O Epidermal cyst of neck    • History of obesity    • Hx of renal calculi    • Hyperlipemia    • Hypothyroidism    • Kidney  "stone 2004   • Leukocytosis    • Mild depression (CMS/HCC)    • Osteoarthritis     Lower leg, localized   • 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    Objective   /82 (BP Location: Left arm, Patient Position: Sitting, Cuff Size: Large Adult)   Pulse 80   Temp 98.3 °F (36.8 °C) (Oral)   Ht 157.5 cm (62.01\")   Wt 134 kg (296 lb) Comment: pt reported  SpO2 98%   BMI 54.12 kg/m²     General Appearance:    Alert, cooperative, no distress, appears stated age   Head:    Normocephalic, without obvious abnormality, atraumatic   Eyes:    PERRL, conjunctiva/corneas clear, EOM's intact, fundi     benign, both eyes   Ears:    Normal TM's and external ear canals, both ears   Nose:   Nares normal, septum midline, mucosa normal, no drainage    or sinus tenderness   Throat:   Lips, mucosa, and tongue normal; teeth and gums normal   Neck:   Supple, symmetrical, trachea midline, no adenopathy;     thyroid:  no enlargement/tenderness/nodules; no carotid    bruit or JVD   Back:     Symmetric, no curvature, ROM normal, no CVA tenderness   Lungs:     Clear to auscultation bilaterally, respirations unlabored   Chest Wall:    No tenderness or deformity    Heart:    Regular rate and rhythm, S1 and S2 normal, no murmur, rub   or gallop       Abdomen:     Soft, non-tender, bowel sounds active all four quadrants,     no masses, no organomegaly           Extremities:   Extremities normal, atraumatic, no cyanosis or edema   Pulses:   2+ and symmetric all extremities   Skin:   Skin color, texture, turgor normal, no rashes or lesions   Lymph nodes:   Cervical, supraclavicular, and axillary nodes normal   Neurologic:   CNII-XII intact, normal strength, sensation and reflexes     throughout       Vitals:    02/05/20 0754   BP: 144/82   Pulse: 80   Temp: 98.3 °F (36.8 °C)   SpO2: 98%     Body mass index is 54.12 " kg/m².      Medications:   Current Outpatient Medications:   •  aspirin 81 MG tablet, Take 1 tablet by mouth Daily. PT TO STOP PER MD INSTRUCTION, Disp: , Rfl:   •  atorvastatin (LIPITOR) 20 MG tablet, TAKE 1 TABLET BY MOUTH AT BEDTIME , Disp: 90 tablet, Rfl: 1  •  B Complex Vitamins (VITAMIN-B COMPLEX PO), Take 1 tablet by mouth daily., Disp: , Rfl:   •  Biotin (BIOTIN MAXIMUM STRENGTH) 10 MG tablet, Take 1 capsule by mouth daily., Disp: , Rfl:   •  Calcium Carb-Cholecalciferol (CALCIUM 600+D3 PO), Take 1 tablet by mouth daily., Disp: , Rfl:   •  cyclobenzaprine (FLEXERIL) 10 MG tablet, Take 1 tablet by mouth 2 (two) times a day as needed., Disp: , Rfl:   •  Dapagliflozin Propanediol (FARXIGA) 10 MG tablet, Take 1 tablet by mouth Every Morning., Disp: , Rfl:   •  dexlansoprazole (DEXILANT) 30 MG capsule, Take 30 mg by mouth Daily., Disp: , Rfl:   •  esomeprazole (nexIUM) 20 MG capsule, Take 20 mg by mouth Every Morning Before Breakfast., Disp: , Rfl:   •  KLS ALLERCLEAR D-12HR 5-120 MG per 12 hr tablet, TAKE ONE TABLET BY MOUTH ONE TIME DAILY , Disp: 60 tablet, Rfl: 1  •  levothyroxine (SYNTHROID, LEVOTHROID) 100 MCG tablet, TAKE 1 TABLET BY MOUTH ONCE DAILY, Disp: 90 tablet, Rfl: 1  •  losartan (COZAAR) 100 MG tablet, TAKE 1 TABLET BY MOUTH ONCE DAILY, Disp: 90 tablet, Rfl: 1  •  metFORMIN (GLUCOPHAGE) 500 MG tablet, Take 500 mg by mouth 2 (Two) Times a Day With Meals., Disp: , Rfl:   •  Multiple Vitamin (MULTI VITAMIN DAILY PO), Take 1 tablet by mouth daily., Disp: , Rfl:   •  naproxen (NAPROSYN) 500 MG tablet, Take 500 mg by mouth As Needed., Disp: , Rfl: 0  •  naproxen (NAPROSYN) 500 MG tablet, , Disp: , Rfl:   •  Omega-3 Fatty Acids (FISH OIL) 1000 MG capsule capsule, Take 1,000 mg by mouth Daily With Breakfast. PT TO STOP PER MD INSTRUCTION, Disp: , Rfl:   •  sertraline (ZOLOFT) 100 MG tablet, TAKE 1 TABLET BY MOUTH ONCE DAILY, Disp: 90 tablet, Rfl: 1       Assessment/Plan   Healthy female exam.      1.  Healthcare Maintenance:  2. Patient Counseling:  --Nutrition: Stressed importance of moderation in sodium/caffeine intake, saturated fat and cholesterol, caloric balance, sufficient intake of fresh fruits, vegetables, fiber, calcium and vit D  --Exercise: I have rec some reg exercise  --Substance Abuse: no tob no etoh  --Dental health: she does go to the dentist reg  --Immunizations reviewed.UTD  --Discussed benefits of screening colonoscopy. S/p colon  3.  HTN-  Ok with current meds  4.  HPL- ok with atorvastatin  5.  Hypothyroidism ok with current meds

## 2020-02-07 ENCOUNTER — RESULTS ENCOUNTER (OUTPATIENT)
Dept: INTERNAL MEDICINE | Facility: CLINIC | Age: 65
End: 2020-02-07

## 2020-02-07 DIAGNOSIS — E03.9 ACQUIRED HYPOTHYROIDISM: ICD-10-CM

## 2020-02-07 DIAGNOSIS — E11.9 TYPE 2 DIABETES MELLITUS WITHOUT COMPLICATION, WITHOUT LONG-TERM CURRENT USE OF INSULIN (HCC): ICD-10-CM

## 2020-02-07 DIAGNOSIS — E78.5 HYPERLIPIDEMIA, UNSPECIFIED HYPERLIPIDEMIA TYPE: ICD-10-CM

## 2020-03-09 RX ORDER — LORATADINE, PSEUDOEPHEDRINE 5; 120 MG/1; MG/1
TABLET, EXTENDED RELEASE ORAL
Qty: 60 TABLET | Refills: 0 | Status: SHIPPED | OUTPATIENT
Start: 2020-03-09 | End: 2020-05-12

## 2020-05-05 ENCOUNTER — RESULTS ENCOUNTER (OUTPATIENT)
Dept: INTERNAL MEDICINE | Facility: CLINIC | Age: 65
End: 2020-05-05

## 2020-05-05 DIAGNOSIS — E11.9 TYPE 2 DIABETES MELLITUS WITHOUT COMPLICATION, WITHOUT LONG-TERM CURRENT USE OF INSULIN (HCC): ICD-10-CM

## 2020-05-12 RX ORDER — LORATADINE, PSEUDOEPHEDRINE 5; 120 MG/1; MG/1
TABLET, EXTENDED RELEASE ORAL
Qty: 60 TABLET | Refills: 0 | Status: SHIPPED | OUTPATIENT
Start: 2020-05-12 | End: 2020-08-13

## 2020-06-15 RX ORDER — LEVOTHYROXINE SODIUM 0.1 MG/1
TABLET ORAL
Qty: 90 TABLET | Refills: 0 | Status: SHIPPED | OUTPATIENT
Start: 2020-06-15 | End: 2020-09-11

## 2020-06-16 ENCOUNTER — LAB (OUTPATIENT)
Dept: OTHER | Facility: HOSPITAL | Age: 65
End: 2020-06-16

## 2020-06-16 DIAGNOSIS — D50.0 IRON DEFICIENCY ANEMIA DUE TO CHRONIC BLOOD LOSS: ICD-10-CM

## 2020-06-16 LAB
BASOPHILS # BLD AUTO: 0.19 10*3/MM3 (ref 0–0.2)
BASOPHILS NFR BLD AUTO: 1.2 % (ref 0–1.5)
DEPRECATED RDW RBC AUTO: 45.1 FL (ref 37–54)
EOSINOPHIL # BLD AUTO: 0.44 10*3/MM3 (ref 0–0.4)
EOSINOPHIL NFR BLD AUTO: 2.9 % (ref 0.3–6.2)
ERYTHROCYTE [DISTWIDTH] IN BLOOD BY AUTOMATED COUNT: 14.5 % (ref 12.3–15.4)
FERRITIN SERPL-MCNC: 183.8 NG/ML (ref 13–150)
HCT VFR BLD AUTO: 42.2 % (ref 34–46.6)
HGB BLD-MCNC: 13.4 G/DL (ref 12–15.9)
IMM GRANULOCYTES # BLD AUTO: 0.1 10*3/MM3 (ref 0–0.05)
IMM GRANULOCYTES NFR BLD AUTO: 0.7 % (ref 0–0.5)
IRON 24H UR-MRATE: 64 MCG/DL (ref 37–145)
IRON SATN MFR SERPL: 16 % (ref 20–50)
LYMPHOCYTES # BLD AUTO: 5.29 10*3/MM3 (ref 0.7–3.1)
LYMPHOCYTES NFR BLD AUTO: 34.6 % (ref 19.6–45.3)
MCH RBC QN AUTO: 27.2 PG (ref 26.6–33)
MCHC RBC AUTO-ENTMCNC: 31.8 G/DL (ref 31.5–35.7)
MCV RBC AUTO: 85.8 FL (ref 79–97)
MONOCYTES # BLD AUTO: 1.09 10*3/MM3 (ref 0.1–0.9)
MONOCYTES NFR BLD AUTO: 7.1 % (ref 5–12)
NEUTROPHILS # BLD AUTO: 8.19 10*3/MM3 (ref 1.7–7)
NEUTROPHILS NFR BLD AUTO: 53.5 % (ref 42.7–76)
NRBC BLD AUTO-RTO: 0 /100 WBC (ref 0–0.2)
PLATELET # BLD AUTO: 485 10*3/MM3 (ref 140–450)
PMV BLD AUTO: 10.2 FL (ref 6–12)
RBC # BLD AUTO: 4.92 10*6/MM3 (ref 3.77–5.28)
TIBC SERPL-MCNC: 402 MCG/DL (ref 298–536)
TRANSFERRIN SERPL-MCNC: 270 MG/DL (ref 200–360)
WBC NRBC COR # BLD: 15.3 10*3/MM3 (ref 3.4–10.8)

## 2020-06-16 PROCEDURE — 84466 ASSAY OF TRANSFERRIN: CPT | Performed by: INTERNAL MEDICINE

## 2020-06-16 PROCEDURE — 85025 COMPLETE CBC W/AUTO DIFF WBC: CPT | Performed by: INTERNAL MEDICINE

## 2020-06-16 PROCEDURE — 83540 ASSAY OF IRON: CPT | Performed by: INTERNAL MEDICINE

## 2020-06-16 PROCEDURE — 83036 HEMOGLOBIN GLYCOSYLATED A1C: CPT | Performed by: INTERNAL MEDICINE

## 2020-06-16 PROCEDURE — 82728 ASSAY OF FERRITIN: CPT | Performed by: INTERNAL MEDICINE

## 2020-06-16 PROCEDURE — 36415 COLL VENOUS BLD VENIPUNCTURE: CPT | Performed by: INTERNAL MEDICINE

## 2020-06-17 LAB — HBA1C MFR BLD: 7.2 % (ref 4.8–5.6)

## 2020-06-19 ENCOUNTER — TELEPHONE (OUTPATIENT)
Dept: ONCOLOGY | Facility: CLINIC | Age: 65
End: 2020-06-19

## 2020-06-19 NOTE — TELEPHONE ENCOUNTER
Patient requests that 6/23 appointment be canceled unless Dr. Corey thinks there is some reason that she really needs to come in to the office.    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  She doesn't think there are any big changes.      [phone]

## 2020-06-19 NOTE — TELEPHONE ENCOUNTER
Spoke with pt regarding cancellation of 6/23 appt.  Pt labs are stable and pt does not have any concerns to address with Dr. Corey at this time.  This nurse will message Dr. Corey to see his preference on next scheduled appt for pt.  Pt agreeable to this.

## 2020-06-23 ENCOUNTER — APPOINTMENT (OUTPATIENT)
Dept: OTHER | Facility: HOSPITAL | Age: 65
End: 2020-06-23

## 2020-06-23 ENCOUNTER — APPOINTMENT (OUTPATIENT)
Dept: ONCOLOGY | Facility: HOSPITAL | Age: 65
End: 2020-06-23

## 2020-07-20 RX ORDER — LOSARTAN POTASSIUM 100 MG/1
TABLET ORAL
Qty: 90 TABLET | Refills: 1 | Status: SHIPPED | OUTPATIENT
Start: 2020-07-20 | End: 2021-01-11

## 2020-08-13 RX ORDER — LORATADINE, PSEUDOEPHEDRINE 5; 120 MG/1; MG/1
TABLET, EXTENDED RELEASE ORAL
Qty: 60 TABLET | Refills: 1 | Status: SHIPPED | OUTPATIENT
Start: 2020-08-13 | End: 2020-12-07

## 2020-09-08 ENCOUNTER — LAB (OUTPATIENT)
Dept: OTHER | Facility: HOSPITAL | Age: 65
End: 2020-09-08

## 2020-09-08 DIAGNOSIS — D50.0 IRON DEFICIENCY ANEMIA DUE TO CHRONIC BLOOD LOSS: Primary | ICD-10-CM

## 2020-09-08 LAB
FERRITIN SERPL-MCNC: 192.5 NG/ML (ref 13–150)
IRON 24H UR-MRATE: 48 MCG/DL (ref 37–145)
IRON SATN MFR SERPL: 13 % (ref 20–50)
TIBC SERPL-MCNC: 378 MCG/DL (ref 298–536)
TRANSFERRIN SERPL-MCNC: 254 MG/DL (ref 200–360)

## 2020-09-08 PROCEDURE — 84466 ASSAY OF TRANSFERRIN: CPT | Performed by: INTERNAL MEDICINE

## 2020-09-08 PROCEDURE — 83540 ASSAY OF IRON: CPT | Performed by: INTERNAL MEDICINE

## 2020-09-08 PROCEDURE — 82728 ASSAY OF FERRITIN: CPT | Performed by: INTERNAL MEDICINE

## 2020-09-08 PROCEDURE — 36415 COLL VENOUS BLD VENIPUNCTURE: CPT | Performed by: INTERNAL MEDICINE

## 2020-09-11 ENCOUNTER — TELEPHONE (OUTPATIENT)
Dept: ONCOLOGY | Facility: CLINIC | Age: 65
End: 2020-09-11

## 2020-09-11 RX ORDER — LEVOTHYROXINE SODIUM 0.1 MG/1
TABLET ORAL
Qty: 90 TABLET | Refills: 1 | Status: SHIPPED | OUTPATIENT
Start: 2020-09-11 | End: 2021-03-16

## 2020-09-15 ENCOUNTER — APPOINTMENT (OUTPATIENT)
Dept: OTHER | Facility: HOSPITAL | Age: 65
End: 2020-09-15

## 2020-09-15 ENCOUNTER — APPOINTMENT (OUTPATIENT)
Dept: ONCOLOGY | Facility: HOSPITAL | Age: 65
End: 2020-09-15

## 2020-12-07 RX ORDER — LORATADINE, PSEUDOEPHEDRINE 5; 120 MG/1; MG/1
TABLET, EXTENDED RELEASE ORAL
Qty: 60 TABLET | Refills: 1 | Status: SHIPPED | OUTPATIENT
Start: 2020-12-07 | End: 2021-04-26 | Stop reason: SDUPTHER

## 2020-12-10 RX ORDER — SERTRALINE HYDROCHLORIDE 100 MG/1
TABLET, FILM COATED ORAL
Qty: 90 TABLET | Refills: 1 | Status: SHIPPED | OUTPATIENT
Start: 2020-12-10 | End: 2021-06-22

## 2021-01-11 RX ORDER — LOSARTAN POTASSIUM 100 MG/1
TABLET ORAL
Qty: 90 TABLET | Refills: 1 | Status: SHIPPED | OUTPATIENT
Start: 2021-01-11 | End: 2021-06-22

## 2021-01-12 DIAGNOSIS — I10 HYPERTENSION, UNSPECIFIED TYPE: ICD-10-CM

## 2021-01-12 DIAGNOSIS — E11.9 TYPE 2 DIABETES MELLITUS WITHOUT COMPLICATION, WITHOUT LONG-TERM CURRENT USE OF INSULIN (HCC): ICD-10-CM

## 2021-01-12 DIAGNOSIS — E03.9 HYPOTHYROIDISM, UNSPECIFIED TYPE: ICD-10-CM

## 2021-01-12 DIAGNOSIS — E78.5 HYPERLIPIDEMIA, UNSPECIFIED HYPERLIPIDEMIA TYPE: Primary | ICD-10-CM

## 2021-01-13 NOTE — PROGRESS NOTES
Subjective Patient indicating that she still is feeling relatively good those  does describe joint discomfort and continued fatigue.    History of Present Illness      The patient 65-year-old female followed by primary care with hypertension, hyperlipidemia and GE reflux as well as hypothyroidism.  She more recently had injured her left shoulder with dislocation January 2019 and is reviewed by orthopedics with a left anterior glenohumeral joint dislocation and radial nerve palsy.  She was seen back in late January by primary care at which time it was also recognized that she has had a degree of leukocytosis documented per medical record at least since September 2015.  These are reviewed as consistent with normal differentials though associated with a degree of thrombocytosis and microcytic, hypochromic indices per her RBCs.  This includes January 2019 with H&H of 12.0 and 39.4, white count of 14,280, again normal automated differential and platelet count of 524,000 and repeat April 30 H&H 12.0 and 42.1 white count 15,300, normal automated differential and platelet count of 565,000.  She did proceed with left shoulder arthroscopy with rotator cuff and labral debridement, left shoulder biceps tenotomy and rotator cuff repair.  She had recent orthopedic assessment follow-up of her previous left knee replacement and now presents for assessment of her leukocytosis.  We discussed her history in detail consultation June 20, 2019 including her diabetes, obesity, thyroid dysfunction, osteoarthritis and surgeries and previous ovarian carcinoid.  Symptomatically she does have a hand pain that worsens particularly in the a.m  and improves throughout the day as well as degree of back pain.  She is otherwise without fever, chills, night sweats or generalized symptoms but does note routine dermatologic follow-up needs.     Upon consultation June 20, 2019 and series of studies were done including iron status reviewing iron  deficiency with iron of 37, TIBC 421, 9% saturation, ferritin 25.8, CRP of 0.42, MMA of 141, B12 87, protein which pheresis without monoclonal spike, sed rate of 31, rheumatoid factor less than 10, RNP less than 0.2, Curry antibodies less than 0.2, CCP antibodies of 32 which is elevated.  These findings were discussed in detail with patient who indicates that she knew she had some degree of rheumatoid symptoms and that previously she been on iron earlier in her life for iron deficiency.  She has had colonoscopy done 2014 by Dr. Tejeda and, at this point, she is loath to repeat the examination.  She will undergo Cologard and I will notify Dr. Pastor.  Her general performance status remains excellent.  The patient is seen back October 15, 2019 and is having increasing fatigue and, indeed, has been found to have a positive Cologuard.  She is scheduled to see GI medicine in the next week and will likely undergo upper and lower endoscopy.  We have further discussed her positive, consistent findings of leukocytosis as well as her relative intolerance to oral iron all of which leads to additional diagnostics with a follow-up CT and the offering of intravenous iron.     The patient was given IV Injectafer and underwent CT scan of the abdomen that was obtained October 22.  This revealed only diverticulosis without diverticulitis, normal spleen and no lymphadenopathy or other acute abnormality.  Patient is also been seen by GI medicine-Dr. Parisi-October 17 and endoscopy is now scheduled.  As she is seen November 7 she feels improved from previous with normalization of her hemoglobin hematocrit and improvement of her microcytosis.  The patient proceeded to endoscopy and was found to have polyps which were removed and found to be tubular adenomas.  She is next seen January 14, 2020 with studies done December 30 demonstrating mild iron deficiency though no significant anemia.  She continues to have some joint discomfort and this  is felt to be the underlying etiology for her elevated ferritin, as well as leukocytosis, in this setting.  Plans were made for the patient to be assessed approxi-6 months later.  As result of insurance issues the patient can no longer receive Injectafer and could only be a candidate for Venofer.     The patient, when seen, January 19, 2021 is not having severe weakness or fatigue though is likely mildly iron deficient with a ferritin that slowly dropped, iron saturation of 13%.  She is a candidate for IV iron but states she rather go back to try oral iron initially.  She additionally states that she may be moving to [place] in the spring as she tries to currently sell her home.  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   • Rotator cuff tear         Past Surgical History:   Procedure Laterality Date   • APPENDECTOMY     • CARPAL TUNNEL RELEASE Left    • CHOLECYSTECTOMY     • COLONOSCOPY  2014   • COLONOSCOPY N/A 11/13/2019    Procedure: COLONOSCOPY TO CECUM WITH POLYPECTOMY ( COLD BX/HOT SNARE);  Surgeon: Kwabena Parisi MD;  Location: [facility] END[facility];  Service: Gastroenterology   • CYSTOSCOPY W/ LASER LITHOTRIPSY     • ENDOSCOPY N/A 11/13/2019    Procedure: ESOPHAGOGASTRODUODENOSCOPY WITH BIOPSY;  Surgeon: Kwabena Parisi MD;  Location: [facility] END[facility];  Service: Gastroenterology   • HYSTERECTOMY     • KNEE ARTHROPLASTY Left     Replaced   • OOPHORECTOMY      Carcinoid of left ovary   • OTHER SURGICAL HISTORY      RESECTION OF OMENTUM   • PILONIDAL CYSTECTOMY     • SHOULDER ARTHROSCOPY W/ ROTATOR CUFF REPAIR Left  1/30/2019    Procedure: LEFT SHOULDER ARTHROSCOPY WITH ACROMIOPLASTY,LABRAL AND ROTATOR CUFF DEBRIDEMENT AND BICEPS TENOTOMY;  Surgeon: Ky Crum MD;  Location: [facility] [place] [facility];  Service: Orthopedics   • SHOULDER MANIPULATION     • TUBAL ABDOMINAL LIGATION     • UPPER GASTROINTESTINAL ENDOSCOPY  2014        Current Outpatient Medications on File Prior to Visit   Medication Sig Dispense Refill   • aspirin 81 MG tablet Take 1 tablet by mouth Daily. PT TO STOP PER MD INSTRUCTION     • atorvastatin (LIPITOR) 20 MG tablet TAKE 1 TABLET BY MOUTH AT BEDTIME  90 tablet 1   • B Complex Vitamins (VITAMIN-B COMPLEX PO) Take 1 tablet by mouth daily.     • Biotin (BIOTIN MAXIMUM STRENGTH) 10 MG tablet Take 1 capsule by mouth daily.     • Calcium Carb-Cholecalciferol (CALCIUM 600+D3 PO) Take 1 tablet by mouth daily.     • cyclobenzaprine (FLEXERIL) 10 MG tablet Take 1 tablet by mouth 2 (two) times a day as needed.     • esomeprazole (nexIUM) 20 MG capsule Take 20 mg by mouth Every Morning Before Breakfast.     • levothyroxine (SYNTHROID, LEVOTHROID) 100 MCG tablet Take 1 tablet by mouth once daily 90 tablet 1   • losartan (COZAAR) 100 MG tablet Take 1 tablet by mouth once daily 90 tablet 1   • metFORMIN (GLUCOPHAGE) 500 MG tablet TAKE 1 TABLET BY MOUTH TWICE DAILY WITH MEALS 180 tablet 1   • Multiple Vitamin (MULTI VITAMIN DAILY PO) Take 1 tablet by mouth daily.     • naproxen (NAPROSYN) 500 MG tablet Take 500 mg by mouth As Needed.  0   • Omega-3 Fatty Acids (FISH OIL) 1000 MG capsule capsule Take 1,000 mg by mouth Daily With Breakfast. PT TO STOP PER MD INSTRUCTION     • sertraline (ZOLOFT) 100 MG tablet Take 1 tablet by mouth once daily 90 tablet 1   • Dapagliflozin Propanediol (FARXIGA) 10 MG tablet Take 1 tablet by mouth Every Morning.     • dexlansoprazole (DEXILANT) 30 MG capsule Take 30 mg by mouth Daily.     • KLS AllerClear D-12HR 5-120 MG per 12 hr tablet TAKE ONE TABLET BY MOUTH ONE TIME DAILY  60 tablet 1   •  [DISCONTINUED] naproxen (NAPROSYN) 500 MG tablet        No current facility-administered medications on file prior to visit.         ALLERGIES:    Allergies   Allergen Reactions   • Influenza Vaccines Unknown - Low Severity     FAMILIAL REACTION        Social History     Socioeconomic History   • Marital status: Single     Spouse name: Not on file   • Number of children: 3   • Years of education: College   • Highest education level: Not on file   Occupational History   • Occupation: Legal nurse consultant     Employer: [employer]   Tobacco Use   • Smoking status: Former Smoker     Packs/day: 0.75     Years: 4.00     Pack years: 3.00     Quit date: 1976     Years since quittin.0   • Smokeless tobacco: Never Used   Substance and Sexual Activity   • Alcohol use: No   • Drug use: No   • Sexual activity: Defer        Family History   Problem Relation Age of Onset   • Transient ischemic attack Mother    • Aneurysm Mother    • Hypertension Mother    • Lung cancer Father    • Heart disease Father    • Hypertension Father    • Sjogren's syndrome Sister    • Diabetes Maternal Grandmother    • Esophageal cancer Maternal Uncle    • Esophageal cancer Maternal Uncle    • Esophageal cancer Maternal Uncle    • Malig Hyperthermia Neg Hx         Review of Systems   Constitutional: Negative for fatigue and unexpected weight change.        Ongoing weight gain   Respiratory: Negative for chest tightness, shortness of breath and wheezing.    Gastrointestinal: Negative for abdominal pain, constipation, diarrhea, nausea and vomiting.        Chronic indigestion   Musculoskeletal: Positive for arthralgias, back pain and joint swelling.   Neurological: Negative for weakness.   All other systems reviewed and are negative.       Objective     Vitals:    21 0923   BP: 145/79   Pulse: 72   Resp: 18   Temp: 98.4 °F (36.9 °C)   TempSrc: Temporal   SpO2: 97%   Weight: 113 kg (248 lb 12.8 oz)   Height:  "157.5 cm (62.01\")   PainSc: 0-No pain     Current Status 1/19/2021   ECOG score 0       Physical Exam   Constitutional: She is oriented to person, place, and time. She appears well-developed.   Morbidly obese  female   HENT:   Head: Normocephalic and atraumatic.   Nose: Nose normal.   Eyes: Pupils are equal, round, and reactive to light. Conjunctivae are normal.   Neck: Normal range of motion. Neck supple.   Cardiovascular: Normal rate, regular rhythm and normal heart sounds.   Pulmonary/Chest: Effort normal and breath sounds normal.   Abdominal: Soft. Bowel sounds are normal.   Morbidly obese  female   Musculoskeletal: Normal range of motion. No tenderness.   Neurological: She is alert and oriented to person, place, and time.   Skin: Skin is warm and dry. No rash noted.         RECENT LABS:  Hematology WBC   Date Value Ref Range Status   01/19/2021 13.42 (H) 3.40 - 10.80 10*3/mm3 Final   04/30/2019 15.30 (H) 3.40 - 10.80 10*3/mm3 Final     RBC   Date Value Ref Range Status   01/19/2021 4.40 3.77 - 5.28 10*6/mm3 Final   04/30/2019 5.41 (H) 3.77 - 5.28 10*6/mm3 Final     Hemoglobin   Date Value Ref Range Status   01/19/2021 12.4 12.0 - 15.9 g/dL Final     Hematocrit   Date Value Ref Range Status   01/19/2021 38.8 34.0 - 46.6 % Final     Platelets   Date Value Ref Range Status   01/19/2021 545 (H) 140 - 450 10*3/mm3 Final        CT ABDOMEN AND PELVIS WITH IV CONTRAST 10/22/19    FINDINGS: There is a moderate degree of diverticulosis throughout the  colon without evidence for diverticulitis. No annular lesions are seen.  There is an average volume of formed stool throughout the colon. There  is a tiny hiatal hernia. The liver appears unremarkable other than a 1.7  cm left hepatic lobe cyst. There is no biliary dilatation. Splenic size  is normal. The pancreas, adrenals, and kidneys appear unremarkable other  than 2 right renal cysts measuring 1.7 cm and less than 1 cm. Adnexal  regions appear " unremarkable. There are mild-moderate abdominal aortic  atherosclerotic changes without aneurysmal dilatation.     IMPRESSION:  1. There is diverticulosis scattered throughout the colon without  evidence for diverticulitis.  2. Splenic size is normal and there is no lymphadenopathy. No acute  abnormality is seen. Please follow-up as clinically indicated.     This report was finalized on 10/24/2019 8:34 AM by Dr. Aditi Soria M.D.    Assessment/Plan        The patient is a 65-year-old  female followed by primary care again with hypertension, hyperlipidemia, GE reflux and hypothyroidism as well as osteoarthritis with previous knee replacement and more recent left shoulder dislocation.  As she has been followed there is recognition of leukocytosis that has been documented at least since September 2015 but also in records from [facility] in and around her knee replacement in 2014.  In review of all of these peripheral blood counts there is no consistent either right or left shift differential or evidence of immature forms though there is now evidence of microcytic hyperchromic indices per her RBCs.  Review of her blood smear today demonstrates a leukocytosis with a normal differential, and again no immature forms but also evidence of microcytic hypochromic RBCs and thrombocytosis with otherwise normal platelets per size.  It is suspected that the patient has a reactive leukocytosis and has had this for some time as well as degree of very likely iron deficiency that is compounding her thrombocytosis which itself may also be reactive.  Discussed in the possibilities of an inflammatory condition seems likely and we have discussed a number of laboratory exams directed at this initially.  At present I do not feel the patient needs a bone marrow aspirate and biopsy but will proceed with initial laboratory testing including B12, MMA, sed rate, rheumatoid factor, iron profile, RADHA, PE and free light chains, ferritin,  anti-CCP assessment, CMP and a conference of profile.  The patient will then be seen in approximately 4 to 6 weeks for follow-up assessment.  We have also, incidentally, discussed her morbid obesity and treatment approaches potentially for this as well.     The patient is next seen July 23, 2019 and we have documented both serologic evidence of RA as well as ongoing evidence of iron deficiency.  We discussed follow-up colonoscopy but she declines doing this at this point and offered iron replacement therapy at least over the next several months.  She indicates that she would consider Cologard and I plan to contact the referral back to Dr. Pastor to have this done.  The patient went on to have this done and was found to have a positive Cologuard.  This is of course concerning and the patient will need to undergo upper and lower endoscopy but also is offered a CT scan of the abdomen the very least under the circumstances.  She is intolerant to oral iron in a longer conversation today and she is still iron deficient.  After considerable discussion we discontinued oral iron, proceed with IV Injectafer, obtained a CT scan of abdomen with negative results and obtain GI consultation.  She is seen back November 11 she is normalized for hemoglobin hematocrit, feels some improvement per stamina and is scheduled for endoscopy in the next several weeks to GI medicine.  After further discussion we discontinue oral iron, had a proceed through GI endoscopy with the above results as she is next seen back January 14 finding only mild iron deficiency without anemia.  Additionally her endoscopy revealed polyps without evidence of malignancy.   In a longer discussion January 14 it is felt that her leukocytosis as well as elevated ferritin is related likely to her rheumatologic disorder, likely RA.  We elected to reassess the patient in January 2021 and find that she is mildly iron deficient but would prefer to move back to oral iron.   "She states that she \"feels woozy \"on oral iron but again would like to restart it.    *At this point we will hold IV iron    *Continue other medications including Naprosyn on lower dose, restart ferrous sulfate 325 mg p.o. daily to twice daily as tolerated                                                                                                                  *Patient to remain on PPI therapy per GI    *Repeat CBC, ferritin, TIBC in 11 weeks    *Return 12 weeks for MD assessment with patient possibly moving at that point.  We will arrange for potential IV iron in the day if needed.  "

## 2021-01-15 DIAGNOSIS — D50.0 IRON DEFICIENCY ANEMIA DUE TO CHRONIC BLOOD LOSS: Primary | ICD-10-CM

## 2021-01-19 ENCOUNTER — LAB (OUTPATIENT)
Dept: OTHER | Facility: HOSPITAL | Age: 66
End: 2021-01-19

## 2021-01-19 ENCOUNTER — OFFICE VISIT (OUTPATIENT)
Dept: ONCOLOGY | Facility: CLINIC | Age: 66
End: 2021-01-19

## 2021-01-19 ENCOUNTER — APPOINTMENT (OUTPATIENT)
Dept: ONCOLOGY | Facility: HOSPITAL | Age: 66
End: 2021-01-19

## 2021-01-19 VITALS
DIASTOLIC BLOOD PRESSURE: 79 MMHG | WEIGHT: 248.8 LBS | RESPIRATION RATE: 18 BRPM | HEIGHT: 62 IN | HEART RATE: 72 BPM | SYSTOLIC BLOOD PRESSURE: 145 MMHG | BODY MASS INDEX: 45.78 KG/M2 | OXYGEN SATURATION: 97 % | TEMPERATURE: 98.4 F

## 2021-01-19 DIAGNOSIS — D50.0 IRON DEFICIENCY ANEMIA DUE TO CHRONIC BLOOD LOSS: ICD-10-CM

## 2021-01-19 DIAGNOSIS — D50.0 IRON DEFICIENCY ANEMIA DUE TO CHRONIC BLOOD LOSS: Primary | ICD-10-CM

## 2021-01-19 LAB
ALBUMIN SERPL-MCNC: 4.4 G/DL (ref 3.5–5.2)
ALBUMIN/GLOB SERPL: 1.8 G/DL
ALP SERPL-CCNC: 95 U/L (ref 39–117)
ALT SERPL W P-5'-P-CCNC: 15 U/L (ref 1–33)
ANION GAP SERPL CALCULATED.3IONS-SCNC: 9.9 MMOL/L (ref 5–15)
AST SERPL-CCNC: 20 U/L (ref 1–32)
BASOPHILS # BLD AUTO: 0.18 10*3/MM3 (ref 0–0.2)
BASOPHILS NFR BLD AUTO: 1.3 % (ref 0–1.5)
BILIRUB SERPL-MCNC: 0.3 MG/DL (ref 0–1.2)
BUN SERPL-MCNC: 16 MG/DL (ref 8–23)
BUN/CREAT SERPL: 15 (ref 7–25)
CALCIUM SPEC-SCNC: 9.6 MG/DL (ref 8.6–10.5)
CHLORIDE SERPL-SCNC: 104 MMOL/L (ref 98–107)
CO2 SERPL-SCNC: 25.1 MMOL/L (ref 22–29)
CREAT SERPL-MCNC: 1.07 MG/DL (ref 0.57–1)
DEPRECATED RDW RBC AUTO: 45 FL (ref 37–54)
EOSINOPHIL # BLD AUTO: 0.43 10*3/MM3 (ref 0–0.4)
EOSINOPHIL NFR BLD AUTO: 3.2 % (ref 0.3–6.2)
ERYTHROCYTE [DISTWIDTH] IN BLOOD BY AUTOMATED COUNT: 14 % (ref 12.3–15.4)
FERRITIN SERPL-MCNC: 172.4 NG/ML (ref 13–150)
GFR SERPL CREATININE-BSD FRML MDRD: 51 ML/MIN/1.73
GLOBULIN UR ELPH-MCNC: 2.4 GM/DL
GLUCOSE SERPL-MCNC: 120 MG/DL (ref 65–99)
HCT VFR BLD AUTO: 38.8 % (ref 34–46.6)
HGB BLD-MCNC: 12.4 G/DL (ref 12–15.9)
IMM GRANULOCYTES # BLD AUTO: 0.03 10*3/MM3 (ref 0–0.05)
IMM GRANULOCYTES NFR BLD AUTO: 0.2 % (ref 0–0.5)
IRON 24H UR-MRATE: 45 MCG/DL (ref 37–145)
IRON SATN MFR SERPL: 13 % (ref 20–50)
LYMPHOCYTES # BLD AUTO: 4.05 10*3/MM3 (ref 0.7–3.1)
LYMPHOCYTES NFR BLD AUTO: 30.2 % (ref 19.6–45.3)
MCH RBC QN AUTO: 28.2 PG (ref 26.6–33)
MCHC RBC AUTO-ENTMCNC: 32 G/DL (ref 31.5–35.7)
MCV RBC AUTO: 88.2 FL (ref 79–97)
MONOCYTES # BLD AUTO: 0.71 10*3/MM3 (ref 0.1–0.9)
MONOCYTES NFR BLD AUTO: 5.3 % (ref 5–12)
NEUTROPHILS NFR BLD AUTO: 59.8 % (ref 42.7–76)
NEUTROPHILS NFR BLD AUTO: 8.02 10*3/MM3 (ref 1.7–7)
NRBC BLD AUTO-RTO: 0 /100 WBC (ref 0–0.2)
PLATELET # BLD AUTO: 545 10*3/MM3 (ref 140–450)
PMV BLD AUTO: 10.5 FL (ref 6–12)
POTASSIUM SERPL-SCNC: 4.6 MMOL/L (ref 3.5–5.2)
PROT SERPL-MCNC: 6.8 G/DL (ref 6–8.5)
RBC # BLD AUTO: 4.4 10*6/MM3 (ref 3.77–5.28)
SODIUM SERPL-SCNC: 139 MMOL/L (ref 136–145)
TIBC SERPL-MCNC: 350 MCG/DL (ref 298–536)
TRANSFERRIN SERPL-MCNC: 235 MG/DL (ref 200–360)
WBC # BLD AUTO: 13.42 10*3/MM3 (ref 3.4–10.8)

## 2021-01-19 PROCEDURE — 99214 OFFICE O/P EST MOD 30 MIN: CPT | Performed by: INTERNAL MEDICINE

## 2021-01-19 PROCEDURE — 84439 ASSAY OF FREE THYROXINE: CPT | Performed by: INTERNAL MEDICINE

## 2021-01-19 PROCEDURE — 83540 ASSAY OF IRON: CPT | Performed by: INTERNAL MEDICINE

## 2021-01-19 PROCEDURE — 80061 LIPID PANEL: CPT | Performed by: INTERNAL MEDICINE

## 2021-01-19 PROCEDURE — 82306 VITAMIN D 25 HYDROXY: CPT | Performed by: INTERNAL MEDICINE

## 2021-01-19 PROCEDURE — 85025 COMPLETE CBC W/AUTO DIFF WBC: CPT | Performed by: INTERNAL MEDICINE

## 2021-01-19 PROCEDURE — 84443 ASSAY THYROID STIM HORMONE: CPT | Performed by: INTERNAL MEDICINE

## 2021-01-19 PROCEDURE — 36415 COLL VENOUS BLD VENIPUNCTURE: CPT

## 2021-01-19 PROCEDURE — 80053 COMPREHEN METABOLIC PANEL: CPT

## 2021-01-19 PROCEDURE — 83036 HEMOGLOBIN GLYCOSYLATED A1C: CPT | Performed by: INTERNAL MEDICINE

## 2021-01-19 PROCEDURE — 82728 ASSAY OF FERRITIN: CPT | Performed by: INTERNAL MEDICINE

## 2021-01-19 PROCEDURE — 84466 ASSAY OF TRANSFERRIN: CPT | Performed by: INTERNAL MEDICINE

## 2021-01-19 RX ORDER — FERROUS SULFATE 325(65) MG
325 TABLET ORAL
Qty: 60 TABLET | Refills: 1 | Status: SHIPPED | OUTPATIENT
Start: 2021-01-19

## 2021-01-20 LAB
25(OH)D3+25(OH)D2 SERPL-MCNC: 33.5 NG/ML (ref 30–100)
CHOLEST SERPL-MCNC: 177 MG/DL (ref 100–199)
HBA1C MFR BLD: 5.8 % (ref 4.8–5.6)
HDLC SERPL-MCNC: 51 MG/DL
LDLC SERPL CALC-MCNC: 107 MG/DL (ref 0–99)
LDLC/HDLC SERPL: 2.1 RATIO (ref 0–3.2)
T4 FREE SERPL-MCNC: 1.4 NG/DL (ref 0.82–1.77)
TRIGL SERPL-MCNC: 104 MG/DL (ref 0–149)
TSH SERPL DL<=0.005 MIU/L-ACNC: 5.59 UIU/ML (ref 0.45–4.5)
VLDLC SERPL CALC-MCNC: 19 MG/DL (ref 5–40)

## 2021-02-08 ENCOUNTER — OFFICE VISIT (OUTPATIENT)
Dept: INTERNAL MEDICINE | Facility: CLINIC | Age: 66
End: 2021-02-08

## 2021-02-08 VITALS
HEART RATE: 77 BPM | SYSTOLIC BLOOD PRESSURE: 142 MMHG | OXYGEN SATURATION: 99 % | HEIGHT: 62 IN | DIASTOLIC BLOOD PRESSURE: 80 MMHG | BODY MASS INDEX: 44.48 KG/M2 | WEIGHT: 241.7 LBS

## 2021-02-08 DIAGNOSIS — Z00.00 HEALTH CARE MAINTENANCE: Primary | ICD-10-CM

## 2021-02-08 DIAGNOSIS — E03.9 ACQUIRED HYPOTHYROIDISM: ICD-10-CM

## 2021-02-08 DIAGNOSIS — I10 ESSENTIAL HYPERTENSION: ICD-10-CM

## 2021-02-08 PROCEDURE — 99397 PER PM REEVAL EST PAT 65+ YR: CPT | Performed by: INTERNAL MEDICINE

## 2021-02-08 NOTE — PROGRESS NOTES
Subjective   Evy Talley is a 65 y.o. female and is here for a comprehensive physical exam. The patient reports problems - htn.  Pt has been taking BP meds as prescribed without any problems.  No HA  No episodes of orthostasis  Pt has been doing well with thyroid meds.  Taking as perscribed without any complications    Pt is UTD with annual gyn exam and mammo utd    Do you take any herbs or supplements that were not prescribed by a doctor? Take vitamins      Social History: she is dating someone now    Social History     Socioeconomic History   • Marital status: Single     Spouse name: Not on file   • Number of children: 3   • Years of education: College   • Highest education level: Not on file   Occupational History   • Occupation: Legal nurse consultant     Employer: [employer]   Tobacco Use   • Smoking status: Former Smoker     Packs/day: 0.75     Years: 4.00     Pack years: 3.00     Quit date: 1976     Years since quittin.0   • Smokeless tobacco: Never Used   Substance and Sexual Activity   • Alcohol use: No   • Drug use: No   • Sexual activity: Defer       Family History:   Family History   Problem Relation Age of Onset   • Transient ischemic attack Mother    • Aneurysm Mother    • Hypertension Mother    • Lung cancer Father    • Heart disease Father    • Hypertension Father    • Sjogren's syndrome Sister    • Diabetes Maternal Grandmother    • Esophageal cancer Maternal Uncle    • Esophageal cancer Maternal Uncle    • Esophageal cancer Maternal Uncle    • Malig Hyperthermia Neg Hx        Past Medical History:   Past Medical History:   Diagnosis Date   • Diabetes mellitus (CMS/HCC)    • Essential hypertension    • GERD (gastroesophageal reflux disease)    • H/O Epidermal cyst of neck    • History of obesity    • Hx of renal calculi    • Hyperlipemia    • Hypothyroidism    • Kidney stone    • Leukocytosis    • Mild depression (CMS/HCC)    • Osteoarthritis     Lower  "leg, localized   • Ovarian cancer (CMS/[facility]) 1995    ovarian carcanoid   • Rotator cuff tear            Review of Systems    A comprehensive review of systems was negative.    Objective   /80 (BP Location: Left arm, Patient Position: Sitting)   Pulse 77   Ht 157.5 cm (62.01\")   Wt 110 kg (241 lb 11.2 oz)   SpO2 99%   BMI 44.19 kg/m²     General Appearance:    Alert, cooperative, no distress, appears stated age   Head:    Normocephalic, without obvious abnormality, atraumatic   Eyes:    PERRL, conjunctiva/corneas clear, EOM's intact, fundi     benign, both eyes   Ears:    Normal TM's and external ear canals, both ears   Nose:   Nares normal, septum midline, mucosa normal, no drainage    or sinus tenderness   Throat:   Lips, mucosa, and tongue normal; teeth and gums normal   Neck:   Supple, symmetrical, trachea midline, no adenopathy;     thyroid:  no enlargement/tenderness/nodules; no carotid    bruit or JVD   Back:     Symmetric, no curvature, ROM normal, no CVA tenderness   Lungs:     Clear to auscultation bilaterally, respirations unlabored   Chest Wall:    No tenderness or deformity    Heart:    Regular rate and rhythm, S1 and S2 normal, no murmur, rub   or gallop       Abdomen:     Soft, non-tender, bowel sounds active all four quadrants,     no masses, no organomegaly           Extremities:   Extremities normal, atraumatic, no cyanosis or edema   Pulses:   2+ and symmetric all extremities   Skin:   Skin color, texture, turgor normal, no rashes or lesions   Lymph nodes:   Cervical, supraclavicular, and axillary nodes normal   Neurologic:   CNII-XII intact, normal strength, sensation and reflexes     throughout       Vitals:    02/08/21 0954   BP: 142/80   Pulse: 77   SpO2: 99%     Body mass index is 44.19 kg/m².      Medications:   Current Outpatient Medications:   •  aspirin 81 MG tablet, Take 1 tablet by mouth Daily. PT TO STOP PER MD INSTRUCTION, Disp: , Rfl:   •  atorvastatin (LIPITOR) 20 MG " tablet, TAKE 1 TABLET BY MOUTH AT BEDTIME , Disp: 90 tablet, Rfl: 1  •  B Complex Vitamins (VITAMIN-B COMPLEX PO), Take 1 tablet by mouth daily., Disp: , Rfl:   •  Biotin (BIOTIN MAXIMUM STRENGTH) 10 MG tablet, Take 1 capsule by mouth daily., Disp: , Rfl:   •  Calcium Carb-Cholecalciferol (CALCIUM 600+D3 PO), Take 1 tablet by mouth daily., Disp: , Rfl:   •  cyclobenzaprine (FLEXERIL) 10 MG tablet, Take 1 tablet by mouth 2 (two) times a day as needed., Disp: , Rfl:   •  esomeprazole (nexIUM) 20 MG capsule, Take 20 mg by mouth Every Morning Before Breakfast., Disp: , Rfl:   •  ferrous sulfate 325 (65 FE) MG tablet, Take 1 tablet by mouth Daily With Breakfast., Disp: 60 tablet, Rfl: 1  •  KLS AllerClear D-12HR 5-120 MG per 12 hr tablet, TAKE ONE TABLET BY MOUTH ONE TIME DAILY , Disp: 60 tablet, Rfl: 1  •  levothyroxine (SYNTHROID, LEVOTHROID) 100 MCG tablet, Take 1 tablet by mouth once daily, Disp: 90 tablet, Rfl: 1  •  losartan (COZAAR) 100 MG tablet, Take 1 tablet by mouth once daily, Disp: 90 tablet, Rfl: 1  •  metFORMIN (GLUCOPHAGE) 500 MG tablet, TAKE 1 TABLET BY MOUTH TWICE DAILY WITH MEALS, Disp: 180 tablet, Rfl: 1  •  Multiple Vitamin (MULTI VITAMIN DAILY PO), Take 1 tablet by mouth daily., Disp: , Rfl:   •  naproxen (NAPROSYN) 500 MG tablet, Take 500 mg by mouth As Needed., Disp: , Rfl: 0  •  Omega-3 Fatty Acids (FISH OIL) 1000 MG capsule capsule, Take 1,000 mg by mouth Daily With Breakfast. PT TO STOP PER MD INSTRUCTION, Disp: , Rfl:   •  sertraline (ZOLOFT) 100 MG tablet, Take 1 tablet by mouth once daily, Disp: 90 tablet, Rfl: 1  •  Dapagliflozin Propanediol (FARXIGA) 10 MG tablet, Take 1 tablet by mouth Every Morning., Disp: , Rfl:        Assessment/Plan   Healthy female exam.      1. Healthcare Maintenance:  2. Patient Counseling:  --Nutrition: Stressed importance of moderation in sodium/caffeine intake, saturated fat and cholesterol, caloric balance, sufficient intake of fresh fruits, vegetables, fiber,  calcium and vit D  --Exercise: she does exercise reg  --Substance Abuse: no tob no etoh  --Dental health: she does go to the dentist reg  --Immunizations reviewed.  --Discussed benefits of screening colonoscopy.  3.  htn-  Ok with current  4. HPL-  Ok with lipitor  5. Hypothyroidism- ok with current meds

## 2021-03-16 ENCOUNTER — BULK ORDERING (OUTPATIENT)
Dept: CASE MANAGEMENT | Facility: OTHER | Age: 66
End: 2021-03-16

## 2021-03-16 DIAGNOSIS — Z23 IMMUNIZATION DUE: ICD-10-CM

## 2021-03-16 RX ORDER — LEVOTHYROXINE SODIUM 0.1 MG/1
TABLET ORAL
Qty: 90 TABLET | Refills: 0 | Status: SHIPPED | OUTPATIENT
Start: 2021-03-16 | End: 2021-06-22

## 2021-04-06 ENCOUNTER — LAB (OUTPATIENT)
Dept: OTHER | Facility: HOSPITAL | Age: 66
End: 2021-04-06

## 2021-04-06 DIAGNOSIS — D50.0 IRON DEFICIENCY ANEMIA DUE TO CHRONIC BLOOD LOSS: ICD-10-CM

## 2021-04-06 LAB
BASOPHILS # BLD AUTO: 0.17 10*3/MM3 (ref 0–0.2)
BASOPHILS NFR BLD AUTO: 1.3 % (ref 0–1.5)
DEPRECATED RDW RBC AUTO: 44.3 FL (ref 37–54)
EOSINOPHIL # BLD AUTO: 0.57 10*3/MM3 (ref 0–0.4)
EOSINOPHIL NFR BLD AUTO: 4.5 % (ref 0.3–6.2)
ERYTHROCYTE [DISTWIDTH] IN BLOOD BY AUTOMATED COUNT: 14 % (ref 12.3–15.4)
FERRITIN SERPL-MCNC: 178 NG/ML (ref 13–150)
HCT VFR BLD AUTO: 41.4 % (ref 34–46.6)
HGB BLD-MCNC: 13.4 G/DL (ref 12–15.9)
IMM GRANULOCYTES # BLD AUTO: 0.04 10*3/MM3 (ref 0–0.05)
IMM GRANULOCYTES NFR BLD AUTO: 0.3 % (ref 0–0.5)
IRON 24H UR-MRATE: 59 MCG/DL (ref 37–145)
IRON SATN MFR SERPL: 17 % (ref 20–50)
LYMPHOCYTES # BLD AUTO: 4.21 10*3/MM3 (ref 0.7–3.1)
LYMPHOCYTES NFR BLD AUTO: 33.3 % (ref 19.6–45.3)
MCH RBC QN AUTO: 27.9 PG (ref 26.6–33)
MCHC RBC AUTO-ENTMCNC: 32.4 G/DL (ref 31.5–35.7)
MCV RBC AUTO: 86.3 FL (ref 79–97)
MONOCYTES # BLD AUTO: 0.57 10*3/MM3 (ref 0.1–0.9)
MONOCYTES NFR BLD AUTO: 4.5 % (ref 5–12)
NEUTROPHILS NFR BLD AUTO: 56.1 % (ref 42.7–76)
NEUTROPHILS NFR BLD AUTO: 7.07 10*3/MM3 (ref 1.7–7)
NRBC BLD AUTO-RTO: 0 /100 WBC (ref 0–0.2)
PLATELET # BLD AUTO: 556 10*3/MM3 (ref 140–450)
PMV BLD AUTO: 10.3 FL (ref 6–12)
RBC # BLD AUTO: 4.8 10*6/MM3 (ref 3.77–5.28)
TIBC SERPL-MCNC: 350 MCG/DL (ref 298–536)
TRANSFERRIN SERPL-MCNC: 235 MG/DL (ref 200–360)
WBC # BLD AUTO: 12.63 10*3/MM3 (ref 3.4–10.8)

## 2021-04-06 PROCEDURE — 36415 COLL VENOUS BLD VENIPUNCTURE: CPT

## 2021-04-06 PROCEDURE — 82728 ASSAY OF FERRITIN: CPT | Performed by: INTERNAL MEDICINE

## 2021-04-06 PROCEDURE — 85025 COMPLETE CBC W/AUTO DIFF WBC: CPT | Performed by: INTERNAL MEDICINE

## 2021-04-06 PROCEDURE — 84466 ASSAY OF TRANSFERRIN: CPT | Performed by: INTERNAL MEDICINE

## 2021-04-06 PROCEDURE — 83540 ASSAY OF IRON: CPT | Performed by: INTERNAL MEDICINE

## 2021-04-13 ENCOUNTER — APPOINTMENT (OUTPATIENT)
Dept: ONCOLOGY | Facility: HOSPITAL | Age: 66
End: 2021-04-13

## 2021-04-13 ENCOUNTER — APPOINTMENT (OUTPATIENT)
Dept: OTHER | Facility: HOSPITAL | Age: 66
End: 2021-04-13

## 2021-04-13 ENCOUNTER — TELEPHONE (OUTPATIENT)
Dept: ONCOLOGY | Facility: CLINIC | Age: 66
End: 2021-04-13

## 2021-04-13 NOTE — TELEPHONE ENCOUNTER
"    Caller: PT    Relationship: PT    Best call back number: [phone]    What is the best time to reach you: ANYTIME    Who are you requesting to speak with (clinical staff, provider,  specific staff member): SCHED    Do you know the name of the person who called:NA    What was the call regarding: PT STATES SHE ATTEMPTED TO CANCEL 4/13 APPT ON 4/9 AND 4/12 VIRTUALLY [facility]IA [employer] BUT RECD A \"NO SHOW\" STATEMENT.    PT WOULD LIKE INFO CORRECTED    Do you require a callback: YES          "

## 2021-04-26 RX ORDER — LORATADINE, PSEUDOEPHEDRINE 5; 120 MG/1; MG/1
1 TABLET, EXTENDED RELEASE ORAL DAILY
Qty: 60 TABLET | Refills: 1 | Status: SHIPPED | OUTPATIENT
Start: 2021-04-26 | End: 2021-12-13 | Stop reason: SDUPTHER

## 2021-06-22 RX ORDER — SERTRALINE HYDROCHLORIDE 100 MG/1
TABLET, FILM COATED ORAL
Qty: 90 TABLET | Refills: 0 | Status: SHIPPED | OUTPATIENT
Start: 2021-06-22 | End: 2021-12-13 | Stop reason: SDUPTHER

## 2021-06-22 RX ORDER — ATORVASTATIN CALCIUM 20 MG/1
20 TABLET, FILM COATED ORAL
Qty: 90 TABLET | Refills: 0 | Status: SHIPPED | OUTPATIENT
Start: 2021-06-22 | End: 2021-12-13 | Stop reason: SDUPTHER

## 2021-06-22 RX ORDER — LEVOTHYROXINE SODIUM 100 UG/1
TABLET ORAL
Qty: 90 TABLET | Refills: 0 | Status: CANCELLED | OUTPATIENT
Start: 2021-06-22

## 2021-06-22 RX ORDER — LEVOTHYROXINE SODIUM 100 UG/1
TABLET ORAL
Qty: 90 TABLET | Refills: 0 | Status: SHIPPED | OUTPATIENT
Start: 2021-06-22 | End: 2021-09-07

## 2021-06-22 RX ORDER — LOSARTAN POTASSIUM 100 MG/1
TABLET ORAL
Qty: 90 TABLET | Refills: 0 | Status: SHIPPED | OUTPATIENT
Start: 2021-06-22 | End: 2021-09-07

## 2021-06-22 NOTE — TELEPHONE ENCOUNTER
----- Message from Evy Talley sent at 6/22/2021  8:19 AM EDT -----  Regarding: Prescription Question  Contact: [phone]  [facility] pharmacy will be calling you for a refill of my Atorvastatin 20 mg today.  It was previously with [facility] pharmacy and I switched.    If possible please refill all scripts for 90 days.  - not the 30 days    thank you     Evy Talley

## 2021-07-01 ENCOUNTER — TELEPHONE (OUTPATIENT)
Dept: INTERNAL MEDICINE | Facility: CLINIC | Age: 66
End: 2021-07-01

## 2021-07-01 RX ORDER — CYCLOBENZAPRINE HCL 10 MG
10 TABLET ORAL 2 TIMES DAILY PRN
Qty: 30 TABLET | Refills: 2 | Status: SHIPPED | OUTPATIENT
Start: 2021-07-01

## 2021-07-01 NOTE — TELEPHONE ENCOUNTER
----- Message from Abimbola Rodriguez MA sent at 7/1/2021 10:13 AM EDT -----  Regarding: FW: Prescription Question  Contact: [phone]    ----- Message -----  From: Evy Talley  Sent: 7/1/2021  10:09 AM EDT  To: Antonette Smalls [place] [address] Clinical Pool  Subject: Prescription Question                            Is it possible to have Dr. Pastor refill prescription for Flexeril as Doctor I used to see for it is not practicing.  I only take them rarely but need for back and neck pain occasionally.    just my regular pharmacy- [facility]    thank you  Evy Talley

## 2021-09-07 RX ORDER — LOSARTAN POTASSIUM 100 MG/1
TABLET ORAL
Qty: 90 TABLET | Refills: 1 | Status: SHIPPED | OUTPATIENT
Start: 2021-09-07 | End: 2022-02-14 | Stop reason: SDUPTHER

## 2021-09-07 RX ORDER — LEVOTHYROXINE SODIUM 100 UG/1
TABLET ORAL
Qty: 90 TABLET | Refills: 1 | Status: SHIPPED | OUTPATIENT
Start: 2021-09-07 | End: 2022-02-14 | Stop reason: SDUPTHER

## 2021-10-01 PROBLEM — T45.4X5S ADVERSE EFFECT OF IRON AND ITS COMPOUNDS, SEQUELA: Status: ACTIVE | Noted: 2019-10-15

## 2021-12-13 RX ORDER — LORATADINE, PSEUDOEPHEDRINE 5; 120 MG/1; MG/1
1 TABLET, EXTENDED RELEASE ORAL DAILY
Qty: 90 TABLET | Refills: 0 | Status: SHIPPED | OUTPATIENT
Start: 2021-12-13

## 2021-12-13 RX ORDER — SERTRALINE HYDROCHLORIDE 100 MG/1
100 TABLET, FILM COATED ORAL DAILY
Qty: 90 TABLET | Refills: 0 | Status: SHIPPED | OUTPATIENT
Start: 2021-12-13

## 2021-12-13 RX ORDER — ATORVASTATIN CALCIUM 20 MG/1
20 TABLET, FILM COATED ORAL
Qty: 90 TABLET | Refills: 0 | Status: SHIPPED | OUTPATIENT
Start: 2021-12-13

## 2022-02-14 RX ORDER — LOSARTAN POTASSIUM 100 MG/1
100 TABLET ORAL DAILY
Qty: 90 TABLET | Refills: 0 | Status: SHIPPED | OUTPATIENT
Start: 2022-02-14

## 2022-02-14 RX ORDER — LEVOTHYROXINE SODIUM 0.1 MG/1
100 TABLET ORAL DAILY
Qty: 90 TABLET | Refills: 0 | Status: SHIPPED | OUTPATIENT
Start: 2022-02-14

## 2022-05-13 RX ORDER — LOSARTAN POTASSIUM 100 MG/1
TABLET ORAL
Qty: 90 TABLET | Refills: 0 | OUTPATIENT
Start: 2022-05-13

## 2022-05-13 RX ORDER — LEVOTHYROXINE SODIUM 0.1 MG/1
TABLET ORAL
Qty: 90 TABLET | Refills: 0 | OUTPATIENT
Start: 2022-05-13

## 2024-05-23 DIAGNOSIS — Z12.31 ENCOUNTER FOR SCREENING MAMMOGRAM FOR MALIGNANT NEOPLASM OF BREAST: Primary | ICD-10-CM

## 2024-08-07 ENCOUNTER — HOSPITAL ENCOUNTER (OUTPATIENT)
Dept: MAMMOGRAPHY | Facility: HOSPITAL | Age: 69
Discharge: HOME | End: 2024-08-07
Admitting: INTERNAL MEDICINE
Payer: COMMERCIAL

## 2024-08-07 DIAGNOSIS — Z12.31 ENCOUNTER FOR SCREENING MAMMOGRAM FOR MALIGNANT NEOPLASM OF BREAST: ICD-10-CM

## 2024-08-07 PROCEDURE — 77063 BREAST TOMOSYNTHESIS BI: CPT

## 2024-08-07 PROCEDURE — 77067 SCR MAMMO BI INCL CAD: CPT

## 2024-11-18 RX ORDER — LEVOTHYROXINE SODIUM 100 UG/1
100 TABLET ORAL DAILY
Qty: 90 TABLET | Refills: 0 | Status: SHIPPED | OUTPATIENT
Start: 2024-11-18 | End: 2024-11-22

## 2024-11-18 RX ORDER — ATORVASTATIN CALCIUM 20 MG/1
20 TABLET, FILM COATED ORAL
Qty: 90 TABLET | Refills: 0 | Status: SHIPPED | OUTPATIENT
Start: 2024-11-18

## 2024-11-18 RX ORDER — SERTRALINE HYDROCHLORIDE 100 MG/1
100 TABLET, FILM COATED ORAL DAILY
Qty: 90 TABLET | Refills: 0 | Status: SHIPPED | OUTPATIENT
Start: 2024-11-18

## 2024-11-18 RX ORDER — LOSARTAN POTASSIUM 100 MG/1
100 TABLET ORAL DAILY
Qty: 90 TABLET | Refills: 0 | Status: SHIPPED | OUTPATIENT
Start: 2024-11-18

## 2024-11-22 RX ORDER — LEVOTHYROXINE SODIUM 112 UG/1
112 TABLET ORAL DAILY
Qty: 90 TABLET | Refills: 0 | Status: SHIPPED | OUTPATIENT
Start: 2024-11-22

## 2024-11-26 ENCOUNTER — TELEPHONE (OUTPATIENT)
Dept: INTERNAL MEDICINE | Facility: CLINIC | Age: 69
End: 2024-11-26
Payer: COMMERCIAL

## 2024-11-26 NOTE — TELEPHONE ENCOUNTER
Pharmacy Name: [facility] - [place], [place] - [address] [facility] [place] [address] - [phone]  - [phone] FX     Reference Number (if applicable): N/A    Pharmacy representative name: MARCIN    Pharmacy representative phone number: [phone]*    What medication are you calling in regards to: levothyroxine (SYNTHROID, LEVOTHROID) 112 MCG tablet     What question does the pharmacy have: NEEDING TO KNOW IF THIS MEDICATION IS REPLACING THE LEVOTHYROXINE 100 MCG, AND IS PATIENT ON-BOARD.    Who is the provider that prescribed the medication: DR. BAY WEAVER    Additional notes: PLEASE CALL PHARMACY TO ADVISE.

## 2025-01-27 RX ORDER — SERTRALINE HYDROCHLORIDE 100 MG/1
100 TABLET, FILM COATED ORAL DAILY
Qty: 30 TABLET | Refills: 0 | Status: SHIPPED | OUTPATIENT
Start: 2025-01-27

## 2025-01-27 RX ORDER — LOSARTAN POTASSIUM 100 MG/1
100 TABLET ORAL DAILY
Qty: 30 TABLET | Refills: 0 | Status: SHIPPED | OUTPATIENT
Start: 2025-01-27

## 2025-02-12 RX ORDER — SERTRALINE HYDROCHLORIDE 100 MG/1
100 TABLET, FILM COATED ORAL DAILY
Qty: 90 TABLET | Refills: 0 | Status: SHIPPED | OUTPATIENT
Start: 2025-02-12

## 2025-02-12 RX ORDER — LOSARTAN POTASSIUM 100 MG/1
100 TABLET ORAL DAILY
Qty: 90 TABLET | Refills: 0 | Status: SHIPPED | OUTPATIENT
Start: 2025-02-12

## 2025-02-13 DIAGNOSIS — E78.5 HYPERLIPIDEMIA, UNSPECIFIED HYPERLIPIDEMIA TYPE: Primary | ICD-10-CM

## 2025-02-13 DIAGNOSIS — E11.9 TYPE 2 DIABETES MELLITUS WITHOUT COMPLICATION, WITHOUT LONG-TERM CURRENT USE OF INSULIN: ICD-10-CM

## 2025-02-13 DIAGNOSIS — E03.9 ACQUIRED HYPOTHYROIDISM: ICD-10-CM

## 2025-02-13 DIAGNOSIS — I10 PRIMARY HYPERTENSION: ICD-10-CM

## 2025-02-13 RX ORDER — LEVOTHYROXINE SODIUM 112 UG/1
112 TABLET ORAL DAILY
Qty: 90 TABLET | Refills: 0 | Status: SHIPPED | OUTPATIENT
Start: 2025-02-13

## 2025-02-15 LAB
ALBUMIN SERPL-MCNC: 4.3 G/DL (ref 3.5–5.2)
ALBUMIN/GLOB SERPL: 1.7 G/DL
ALP SERPL-CCNC: 101 U/L (ref 39–117)
ALT SERPL-CCNC: 28 U/L (ref 1–33)
AST SERPL-CCNC: 28 U/L (ref 1–32)
BASOPHILS # BLD AUTO: 0.2 10*3/MM3 (ref 0–0.2)
BASOPHILS NFR BLD AUTO: 1.3 % (ref 0–1.5)
BILIRUB SERPL-MCNC: 0.3 MG/DL (ref 0–1.2)
BUN SERPL-MCNC: 20 MG/DL (ref 8–23)
BUN/CREAT SERPL: 17.7 (ref 7–25)
CALCIUM SERPL-MCNC: 10.6 MG/DL (ref 8.6–10.5)
CHLORIDE SERPL-SCNC: 101 MMOL/L (ref 98–107)
CHOLEST SERPL-MCNC: 218 MG/DL (ref 0–200)
CO2 SERPL-SCNC: 25 MMOL/L (ref 22–29)
CREAT SERPL-MCNC: 1.13 MG/DL (ref 0.57–1)
EGFRCR SERPLBLD CKD-EPI 2021: 52.1 ML/MIN/1.73
EOSINOPHIL # BLD AUTO: 0.5 10*3/MM3 (ref 0–0.4)
EOSINOPHIL NFR BLD AUTO: 3.2 % (ref 0.3–6.2)
ERYTHROCYTE [DISTWIDTH] IN BLOOD BY AUTOMATED COUNT: 14.7 % (ref 12.3–15.4)
GLOBULIN SER CALC-MCNC: 2.5 GM/DL
GLUCOSE SERPL-MCNC: 122 MG/DL (ref 65–99)
HBA1C MFR BLD: 6.1 % (ref 4.8–5.6)
HCT VFR BLD AUTO: 41.6 % (ref 34–46.6)
HDLC SERPL-MCNC: 49 MG/DL (ref 40–60)
HGB BLD-MCNC: 13.5 G/DL (ref 12–15.9)
IMM GRANULOCYTES # BLD AUTO: 0.11 10*3/MM3 (ref 0–0.05)
IMM GRANULOCYTES NFR BLD AUTO: 0.7 % (ref 0–0.5)
LDLC SERPL CALC-MCNC: 139 MG/DL (ref 0–100)
LDLC/HDLC SERPL: 2.77 {RATIO}
LYMPHOCYTES # BLD AUTO: 3.08 10*3/MM3 (ref 0.7–3.1)
LYMPHOCYTES NFR BLD AUTO: 19.9 % (ref 19.6–45.3)
MCH RBC QN AUTO: 27.6 PG (ref 26.6–33)
MCHC RBC AUTO-ENTMCNC: 32.5 G/DL (ref 31.5–35.7)
MCV RBC AUTO: 85.1 FL (ref 79–97)
MONOCYTES # BLD AUTO: 0.79 10*3/MM3 (ref 0.1–0.9)
MONOCYTES NFR BLD AUTO: 5.1 % (ref 5–12)
NEUTROPHILS # BLD AUTO: 10.76 10*3/MM3 (ref 1.7–7)
NEUTROPHILS NFR BLD AUTO: 69.8 % (ref 42.7–76)
NRBC BLD AUTO-RTO: 0 /100 WBC (ref 0–0.2)
PLATELET # BLD AUTO: 708 10*3/MM3 (ref 140–450)
POTASSIUM SERPL-SCNC: 4.8 MMOL/L (ref 3.5–5.2)
PROT SERPL-MCNC: 6.8 G/DL (ref 6–8.5)
RBC # BLD AUTO: 4.89 10*6/MM3 (ref 3.77–5.28)
SODIUM SERPL-SCNC: 136 MMOL/L (ref 136–145)
TRIGL SERPL-MCNC: 166 MG/DL (ref 0–150)
TSH SERPL DL<=0.005 MIU/L-ACNC: 3.98 UIU/ML (ref 0.27–4.2)
VLDLC SERPL CALC-MCNC: 30 MG/DL (ref 5–40)
WBC # BLD AUTO: 15.44 10*3/MM3 (ref 3.4–10.8)

## 2025-02-21 ENCOUNTER — OFFICE VISIT (OUTPATIENT)
Dept: INTERNAL MEDICINE | Facility: CLINIC | Age: 70
End: 2025-02-21
Payer: COMMERCIAL

## 2025-02-21 VITALS
DIASTOLIC BLOOD PRESSURE: 70 MMHG | TEMPERATURE: 98.6 F | HEIGHT: 62 IN | WEIGHT: 282.8 LBS | OXYGEN SATURATION: 98 % | SYSTOLIC BLOOD PRESSURE: 144 MMHG | HEART RATE: 78 BPM | BODY MASS INDEX: 52.04 KG/M2

## 2025-02-21 DIAGNOSIS — R06.09 DOE (DYSPNEA ON EXERTION): ICD-10-CM

## 2025-02-21 DIAGNOSIS — E11.9 TYPE 2 DIABETES MELLITUS WITHOUT COMPLICATION, WITHOUT LONG-TERM CURRENT USE OF INSULIN: ICD-10-CM

## 2025-02-21 DIAGNOSIS — I10 PRIMARY HYPERTENSION: ICD-10-CM

## 2025-02-21 DIAGNOSIS — E03.9 ACQUIRED HYPOTHYROIDISM: ICD-10-CM

## 2025-02-21 DIAGNOSIS — E78.5 HYPERLIPIDEMIA, UNSPECIFIED HYPERLIPIDEMIA TYPE: Primary | ICD-10-CM

## 2025-02-21 PROCEDURE — 99204 OFFICE O/P NEW MOD 45 MIN: CPT | Performed by: INTERNAL MEDICINE

## 2025-02-21 RX ORDER — COVID-19 ANTIGEN TEST
1 KIT MISCELLANEOUS 2 TIMES DAILY
COMMUNITY
Start: 2024-11-01

## 2025-02-21 NOTE — PROGRESS NOTES
Subjective   Evy Talley is a 69 y.o. female.   Fu with FL  History of Present Illness   Pt has been taking cholesterol meds as prescribed.  No difficulties with myalgias.   Pt has been taking BP meds as prescribed without any problems.  No HA  No episodes of orthostasis  Pt has been compliant with meds for GERD.  No sx as long as pt takes medicine as prescribed.  No epigastric pain or reflux sx  Patient has been having some diarrhea from the metformin.  She has been on this for several years its better than it was but it is still an issue.  She has been eating more sugar and carbohydrates.  She also has not been exercising much due to back pain getting worse    The following portions of the patient's history were reviewed and updated as appropriate: allergies, current medications, past family history, past medical history, past social history, past surgical history, and problem list.  No tobacco or alcohol  Review of Systems    Objective   Physical Exam  Vitals reviewed.   Constitutional:       Appearance: She is well-developed.   HENT:      Head: Normocephalic and atraumatic.      Right Ear: External ear normal.      Left Ear: External ear normal.   Eyes:      Conjunctiva/sclera: Conjunctivae normal.      Pupils: Pupils are equal, round, and reactive to light.   Neck:      Thyroid: No thyromegaly.      Trachea: No tracheal deviation.   Cardiovascular:      Rate and Rhythm: Normal rate and regular rhythm.      Heart sounds: Normal heart sounds.   Pulmonary:      Effort: Pulmonary effort is normal.      Breath sounds: Normal breath sounds.   Abdominal:      General: Bowel sounds are normal. There is no distension.      Palpations: Abdomen is soft.      Tenderness: There is no abdominal tenderness.   Musculoskeletal:         General: No deformity. Normal range of motion.      Cervical back: Normal range of motion.   Skin:     General: Skin is warm and dry.   Neurological:      Mental Status: She is alert and  oriented to person, place, and time.   Psychiatric:         Behavior: Behavior normal.         Thought Content: Thought content normal.         Judgment: Judgment normal.       Vitals:    02/21/25 0839   BP: 144/70   Pulse: 78   Temp: 98.6 °F (37 °C)   SpO2: 98%     Body mass index is 51.71 kg/m².         Assessment & Plan   Diagnoses and all orders for this visit:    1. Hyperlipidemia, unspecified hyperlipidemia type (Primary)    2. Primary hypertension    3. Type 2 diabetes mellitus without complication, without long-term current use of insulin    4. Acquired hypothyroidism     DM-  she has been having diarrhea from the metformin  she has had an A1c 6.6 last year  better today as she has had stress from moving back here  she has gained weight and struggled with weight gain.  I really think the Mounjaro is going to be the best tolerated and will also help with her metabolic profile.  We will start this at 2.5 mg.  We are going to try to wean the metformin due to GI side effects  Chronic back pain-    this is worse with the weight gain  HTN-  ok with current meds  Hypothyroidism-  ok with current meds

## 2025-02-27 ENCOUNTER — TELEPHONE (OUTPATIENT)
Dept: INTERNAL MEDICINE | Facility: CLINIC | Age: 70
End: 2025-02-27
Payer: COMMERCIAL

## 2025-02-27 DIAGNOSIS — D72.829 LEUKOCYTOSIS, UNSPECIFIED TYPE: Primary | ICD-10-CM

## 2025-02-27 DIAGNOSIS — D50.0 IRON DEFICIENCY ANEMIA DUE TO CHRONIC BLOOD LOSS: ICD-10-CM

## 2025-04-07 ENCOUNTER — CONSULT (OUTPATIENT)
Dept: ONCOLOGY | Facility: HOSPITAL | Age: 70
End: 2025-04-07
Payer: COMMERCIAL

## 2025-04-07 ENCOUNTER — LAB (OUTPATIENT)
Dept: ONCOLOGY | Facility: HOSPITAL | Age: 70
End: 2025-04-07
Payer: COMMERCIAL

## 2025-04-07 VITALS
HEIGHT: 62 IN | RESPIRATION RATE: 18 BRPM | HEART RATE: 73 BPM | BODY MASS INDEX: 51.81 KG/M2 | DIASTOLIC BLOOD PRESSURE: 57 MMHG | WEIGHT: 281.53 LBS | OXYGEN SATURATION: 99 % | SYSTOLIC BLOOD PRESSURE: 146 MMHG | TEMPERATURE: 97.6 F

## 2025-04-07 DIAGNOSIS — D72.829 LEUKOCYTOSIS, UNSPECIFIED TYPE: ICD-10-CM

## 2025-04-07 DIAGNOSIS — D50.0 IRON DEFICIENCY ANEMIA DUE TO CHRONIC BLOOD LOSS: ICD-10-CM

## 2025-04-07 DIAGNOSIS — D72.829 LEUKOCYTOSIS, UNSPECIFIED TYPE: Primary | ICD-10-CM

## 2025-04-07 DIAGNOSIS — D75.839 THROMBOCYTOSIS, UNSPECIFIED: ICD-10-CM

## 2025-04-07 LAB
ALBUMIN SERPL-MCNC: 4.3 G/DL (ref 3.5–5.2)
ALBUMIN/GLOB SERPL: 1.3 G/DL
ALP SERPL-CCNC: 82 U/L (ref 39–117)
ALT SERPL W P-5'-P-CCNC: 18 U/L (ref 1–33)
ANION GAP SERPL CALCULATED.3IONS-SCNC: 12.6 MMOL/L (ref 5–15)
AST SERPL-CCNC: 29 U/L (ref 1–32)
BASOPHILS # BLD AUTO: 0.19 10*3/MM3 (ref 0–0.2)
BASOPHILS NFR BLD AUTO: 1.2 % (ref 0–1.5)
BILIRUB SERPL-MCNC: 0.2 MG/DL (ref 0–1.2)
BUN SERPL-MCNC: 33 MG/DL (ref 8–23)
BUN/CREAT SERPL: 30.3 (ref 7–25)
CALCIUM SPEC-SCNC: 10.3 MG/DL (ref 8.6–10.5)
CHLORIDE SERPL-SCNC: 101 MMOL/L (ref 98–107)
CO2 SERPL-SCNC: 23.4 MMOL/L (ref 22–29)
CREAT SERPL-MCNC: 1.09 MG/DL (ref 0.57–1)
DEPRECATED RDW RBC AUTO: 49.5 FL (ref 37–54)
EGFRCR SERPLBLD CKD-EPI 2021: 54.1 ML/MIN/1.73
EOSINOPHIL # BLD AUTO: 0.39 10*3/MM3 (ref 0–0.4)
EOSINOPHIL NFR BLD AUTO: 2.5 % (ref 0.3–6.2)
ERYTHROCYTE [DISTWIDTH] IN BLOOD BY AUTOMATED COUNT: 16 % (ref 12.3–15.4)
FERRITIN SERPL-MCNC: 210.5 NG/ML (ref 13–150)
GLOBULIN UR ELPH-MCNC: 3.3 GM/DL
GLUCOSE SERPL-MCNC: 119 MG/DL (ref 65–99)
HCT VFR BLD AUTO: 39.7 % (ref 34–46.6)
HGB BLD-MCNC: 12.9 G/DL (ref 12–15.9)
IMM GRANULOCYTES # BLD AUTO: 0.09 10*3/MM3 (ref 0–0.05)
IMM GRANULOCYTES NFR BLD AUTO: 0.6 % (ref 0–0.5)
IRON 24H UR-MRATE: 38 MCG/DL (ref 37–145)
IRON SATN MFR SERPL: 11 % (ref 20–50)
LDH SERPL-CCNC: 252 U/L (ref 135–214)
LYMPHOCYTES # BLD AUTO: 3.58 10*3/MM3 (ref 0.7–3.1)
LYMPHOCYTES NFR BLD AUTO: 22.8 % (ref 19.6–45.3)
MCH RBC QN AUTO: 27.7 PG (ref 26.6–33)
MCHC RBC AUTO-ENTMCNC: 32.5 G/DL (ref 31.5–35.7)
MCV RBC AUTO: 85.2 FL (ref 79–97)
MONOCYTES # BLD AUTO: 0.91 10*3/MM3 (ref 0.1–0.9)
MONOCYTES NFR BLD AUTO: 5.8 % (ref 5–12)
NEUTROPHILS NFR BLD AUTO: 10.52 10*3/MM3 (ref 1.7–7)
NEUTROPHILS NFR BLD AUTO: 67.1 % (ref 42.7–76)
NRBC BLD AUTO-RTO: 0 /100 WBC (ref 0–0.2)
PLATELET # BLD AUTO: 672 10*3/MM3 (ref 140–450)
PMV BLD AUTO: 9.9 FL (ref 6–12)
POTASSIUM SERPL-SCNC: 3.8 MMOL/L (ref 3.5–5.2)
PROT SERPL-MCNC: 7.6 G/DL (ref 6–8.5)
RBC # BLD AUTO: 4.66 10*6/MM3 (ref 3.77–5.28)
SODIUM SERPL-SCNC: 137 MMOL/L (ref 136–145)
TIBC SERPL-MCNC: 356 MCG/DL (ref 298–536)
TRANSFERRIN SERPL-MCNC: 239 MG/DL (ref 200–360)
WBC NRBC COR # BLD AUTO: 15.68 10*3/MM3 (ref 3.4–10.8)

## 2025-04-07 PROCEDURE — 99204 OFFICE O/P NEW MOD 45 MIN: CPT | Performed by: INTERNAL MEDICINE

## 2025-04-07 PROCEDURE — 83615 LACTATE (LD) (LDH) ENZYME: CPT

## 2025-04-07 PROCEDURE — G0463 HOSPITAL OUTPT CLINIC VISIT: HCPCS | Performed by: INTERNAL MEDICINE

## 2025-04-07 PROCEDURE — 84466 ASSAY OF TRANSFERRIN: CPT

## 2025-04-07 PROCEDURE — 85025 COMPLETE CBC W/AUTO DIFF WBC: CPT

## 2025-04-07 PROCEDURE — 82728 ASSAY OF FERRITIN: CPT

## 2025-04-07 PROCEDURE — 36415 COLL VENOUS BLD VENIPUNCTURE: CPT

## 2025-04-07 PROCEDURE — 80053 COMPREHEN METABOLIC PANEL: CPT

## 2025-04-07 PROCEDURE — 83540 ASSAY OF IRON: CPT

## 2025-04-07 NOTE — PROGRESS NOTES
Chief Complaint  Leukocytosis unspecified type    Aiyana Pastor MD Secor, Tami, MD    Subjective          Evy Talley presents to [facility] for evaluation of chronically elevated total white blood cell count.  This has been documented going back to at least 2014 with waxing and waning pattern.  She was seen by Dr. Ramachandran with hematology/oncology in [place] in 2019 and followed expectantly.  Patient reports that she has not really had hematology follow-up since that time and she was working in [place] and did not seek follow-up.  She has recently established with a new PCP and repeat lab work again demonstrated elevated total white count but also increase in the platelet count.  She has had iron deficiency anemia in the past requiring iron replacement but she states none recently.  She denies obvious blood loss.  She does have some chronic arthritic pains.  She denies rashes, adenopathy, masses, fever, chills, loss, night sweats.  She notes good appetite.  Because of the blood count abnormalities, she is referred for evaluation.      Current Outpatient Medications on File Prior to Visit   Medication Sig Dispense Refill    aspirin 81 MG tablet Take 1 tablet by mouth Daily. PT TO STOP PER MD INSTRUCTION      atorvastatin (LIPITOR) 20 MG tablet Take 1 tablet by mouth every night at bedtime. 90 tablet 0    B Complex Vitamins (VITAMIN-B COMPLEX PO) Take 1 tablet by mouth daily.      Biotin (BIOTIN MAXIMUM STRENGTH) 10 MG tablet Take 1 capsule by mouth daily.      Calcium Carb-Cholecalciferol (CALCIUM 600+D3 PO) Take 1 tablet by mouth daily.      cyclobenzaprine (FLEXERIL) 10 MG tablet Take 1 tablet by mouth 2 (Two) Times a Day As Needed for Muscle Spasms. 30 tablet 2    esomeprazole (nexIUM) 20 MG capsule Take 1 capsule by mouth Every Morning Before Breakfast.      ferrous sulfate 325 (65 FE) MG tablet Take 1 tablet by mouth Daily With Breakfast. 60 tablet 1    levothyroxine  (SYNTHROID, LEVOTHROID) 112 MCG tablet Take 1 tablet by mouth daily. 90 tablet 0    losartan (COZAAR) 100 MG tablet Take 1 tablet by mouth Daily. 90 tablet 0    metFORMIN (GLUCOPHAGE) 500 MG tablet Take 1 tablet by mouth 2 (Two) Times a Day With Meals. 180 tablet 0    Multiple Vitamin (MULTI VITAMIN DAILY PO) Take 1 tablet by mouth Daily.      Naproxen Sodium (Aleve) 220 MG capsule 1 tablet 2 (Two) Times a Day.      Omega-3 Fatty Acids (FISH OIL) 1000 MG capsule capsule Take 1 capsule by mouth Daily With Breakfast. PT TO STOP PER MD INSTRUCTION      sertraline (ZOLOFT) 100 MG tablet Take 1 tablet by mouth Daily. 90 tablet 0    Tirzepatide 2.5 MG/0.5ML solution auto-injector Inject 2.5 mg under the skin into the appropriate area as directed 1 (One) Time Per Week. 2 mL 0     No current facility-administered medications on file prior to visit.       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    Rotator cuff tear      Past Surgical History:   Procedure Laterality Date    APPENDECTOMY      CARPAL TUNNEL RELEASE Left     CHOLECYSTECTOMY      COLONOSCOPY  2014    COLONOSCOPY N/A 11/13/2019    Procedure: COLONOSCOPY TO CECUM WITH POLYPECTOMY ( COLD BX/HOT SNARE);  Surgeon: Kwabena Parisi MD;  Location: [facility] ENDOSCOPY;  Service: Gastroenterology    CYSTOSCOPY W/ LASER LITHOTRIPSY      ENDOSCOPY N/A 11/13/2019    Procedure: ESOPHAGOGASTRODUODENOSCOPY WITH BIOPSY;  Surgeon: Kwabena Parisi MD;  Location: [facility] ENDOSCOPY;  Service: Gastroenterology    HYSTERECTOMY      KNEE  ARTHROPLASTY Left     Replaced    OOPHORECTOMY      Carcinoid of left ovary    OTHER SURGICAL HISTORY      RESECTION OF OMENTUM    PILONIDAL CYSTECTOMY      SHOULDER ARTHROSCOPY W/ ROTATOR CUFF REPAIR Left 2019    Procedure: LEFT SHOULDER ARTHROSCOPY WITH ACROMIOPLASTY,LABRAL AND ROTATOR CUFF DEBRIDEMENT AND BICEPS TENOTOMY;  Surgeon: Ky Crum MD;  [facility];  Service: Orthopedics    SHOULDER MANIPULATION      TUBAL ABDOMINAL LIGATION      UPPER GASTROINTESTINAL ENDOSCOPY       Social History     Socioeconomic History    Marital status: Single    Number of children: 3    Years of education: College   Tobacco Use    Smoking status: Former     Current packs/day: 0.00     Average packs/day: 0.8 packs/day for 4.0 years (3.0 ttl pk-yrs)     Types: Cigarettes     Start date: 1972     Quit date: 1976     Years since quittin.2    Smokeless tobacco: Never   Substance and Sexual Activity    Alcohol use: No    Drug use: No    Sexual activity: Defer     Family History   Problem Relation Age of Onset    Transient ischemic attack Mother     Aneurysm Mother     Hypertension Mother     Lung cancer Father     Heart disease Father     Hypertension Father     Sjogren's syndrome Sister     Diabetes Maternal Grandmother     Esophageal cancer Maternal Uncle     Esophageal cancer Maternal Uncle     Esophageal cancer Maternal Uncle     Malig Hyperthermia Neg Hx        Objective   Physical Exam  Vitals reviewed. Exam conducted with a chaperone present.   Cardiovascular:      Rate and Rhythm: Normal rate and regular rhythm.      Heart sounds: Normal heart sounds. No murmur heard.     No gallop.   Pulmonary:      Effort: Pulmonary effort is normal.      Breath sounds: Normal breath sounds.   Abdominal:      General: Bowel sounds are normal. There is no distension.      Tenderness: There is no abdominal tenderness.   Musculoskeletal:      Right lower leg: No edema.      Left lower leg: No edema.  "  Lymphadenopathy:      Head:      Right side of head: No preauricular, posterior auricular or occipital adenopathy.      Left side of head: No preauricular, posterior auricular or occipital adenopathy.      Cervical: No cervical adenopathy.      Upper Body:      Right upper body: No supraclavicular adenopathy.      Left upper body: No supraclavicular adenopathy.   Psychiatric:         Mood and Affect: Mood normal.         Behavior: Behavior normal.         Vitals:    04/07/25 1309   BP: 146/57   Pulse: 73   Resp: 18   Temp: 97.6 °F (36.4 °C)   TempSrc: Temporal   SpO2: 99%   Weight: 128 kg (281 lb 8.4 oz)   Height: 157.5 cm (62.01\")   PainSc: 5    PainLoc: Back     ECOG score: 0         PHQ-9 Total Score:                      Result Review :   The following data was reviewed by: Ramiro Schulz MD on 04/07/2025:  Lab Results   Component Value Date    HGB 12.9 04/07/2025    HCT 39.7 04/07/2025    MCV 85.2 04/07/2025     (H) 04/07/2025    WBC 15.68 (H) 04/07/2025    NEUTROABS 10.52 (H) 04/07/2025    LYMPHSABS 3.58 (H) 04/07/2025    MONOSABS 0.91 (H) 04/07/2025    EOSABS 0.39 04/07/2025    BASOSABS 0.19 04/07/2025     Lab Results   Component Value Date    GLUCOSE 119 (H) 04/07/2025    BUN 33 (H) 04/07/2025    CREATININE 1.09 (H) 04/07/2025     04/07/2025    K 3.8 04/07/2025     04/07/2025    CO2 23.4 04/07/2025    CALCIUM 10.3 04/07/2025    PROTEINTOT 7.6 04/07/2025    ALBUMIN 4.3 04/07/2025    BILITOT 0.2 04/07/2025    ALKPHOS 82 04/07/2025    AST 29 04/07/2025    ALT 18 04/07/2025     Lab Results   Component Value Date    FREET4 1.40 01/19/2021    TSH 3.980 02/14/2025     Lab Results   Component Value Date    IRON 38 04/07/2025    LABIRON 11 (L) 04/07/2025    TRANSFERRIN 239 04/07/2025    TIBC 356 04/07/2025     Lab Results   Component Value Date     (H) 04/07/2025    FERRITIN 210.50 (H) 04/07/2025    FIISWEQQ34 1,887 (H) 06/20/2019     No results found for: \"PSA\", \"CEA\", \"AFP\", \"\", " "\"\"            Assessment and Plan    Diagnoses and all orders for this visit:    1. Leukocytosis, unspecified type (Primary)  -     CBC & Differential; Future  -     Comprehensive Metabolic Panel; Future  -     Lactate Dehydrogenase; Future  -     CT Guided Bone Marrow Biopsy And Aspiration; Future  -     Comprehensive Hematopathology Evaluation (Integrated Oncology); Standing  -     Bone Marrow Exam; Standing  -     Flow Cytometry (Integrated Oncology); Standing    2. Iron deficiency anemia due to chronic blood loss  -     Ferritin; Future  -     Iron Profile; Future    3. Thrombocytosis, unspecified  -     CT Guided Bone Marrow Biopsy And Aspiration; Future  -     Comprehensive Hematopathology Evaluation (Integrated Oncology); Standing  -     Bone Marrow Exam; Standing  -     Flow Cytometry (Integrated Oncology); Standing    Patient has a long history of mildly elevated total white blood cell count and platelet count.  By history of this extends back to least 2014 but her white count and platelet count have been elevated in the epic record since at least 2019.  Prior hematology workup reviewed-there thought was reactive process from underlying rheumatologic condition.  Patient does not appear to have any active synovitis at this time.  She has had prior iron deficiency anemia but the hemoglobin and MCV are normal.  Given the persistent and slowly increasing counts, this raises concern for an intrinsic bone marrow problem such as a myeloproliferative neoplasm.  She will have lab work today as outlined but I would recommend proceeding with bone marrow aspiration biopsy to characterize her bone marrow function.  Patient is agreeable to the procedure which will be arranged as soon as possible.  I will see her back in the near future to review her results and plan any additional testing or treatment as warranted.          Patient Follow Up: 6 weeks    Patient was given instructions and counseling regarding her " condition or for health maintenance advice. Please see specific information pulled into the AVS if appropriate.     Ramiro Schulz MD    4/7/2025

## 2025-04-09 ENCOUNTER — TELEPHONE (OUTPATIENT)
Dept: ONCOLOGY | Facility: HOSPITAL | Age: 70
End: 2025-04-09

## 2025-04-09 NOTE — TELEPHONE ENCOUNTER
Caller: Laura Talley    Relationship: Self    Best call back number: [phone]    Who are you requesting to speak with (clinical staff, provider,  specific staff member): CLINICAL     What was the call regarding: LAURA IS CALLING TO CANCEL HER BONE MARROW BIOPSY AND APPOINTMENT WITH DR MYLES    PLEASE ADVISE

## 2025-04-10 ENCOUNTER — PATIENT ROUNDING (BHMG ONLY) (OUTPATIENT)
Dept: ONCOLOGY | Facility: HOSPITAL | Age: 70
End: 2025-04-10
Payer: COMMERCIAL

## 2025-04-10 RX ORDER — TIRZEPATIDE 2.5 MG/.5ML
INJECTION, SOLUTION SUBCUTANEOUS
Qty: 2 ML | Refills: 0 | OUTPATIENT
Start: 2025-04-10

## 2025-04-10 NOTE — PROGRESS NOTES
April 10, 2025    Hello, may I speak with Evy Talley?    My name is Jacquelyn.      I am  with [facility]  [address] [place] [address] [phone].    Before we get started may I verify your date of birth? 1955    I am calling to officially welcome you to our practice and ask about your recent visit. Is this a good time to talk? NO- Sent Mirakl Message    Tell me about your visit with us. What things went well?         We're always looking for ways to make our patients' experiences even better. Do you have recommendations on ways we may improve?      Overall were you satisfied with your first visit to our practice?        I appreciate you taking the time to speak with me today. Is there anything else I can do for you?       Thank you, and have a great day.

## 2025-04-11 ENCOUNTER — TELEPHONE (OUTPATIENT)
Dept: INTERNAL MEDICINE | Facility: CLINIC | Age: 70
End: 2025-04-11
Payer: COMMERCIAL

## 2025-05-08 ENCOUNTER — OFFICE VISIT (OUTPATIENT)
Dept: CARDIOLOGY | Facility: CLINIC | Age: 70
End: 2025-05-08
Payer: COMMERCIAL

## 2025-05-08 VITALS
HEART RATE: 73 BPM | BODY MASS INDEX: 48.95 KG/M2 | DIASTOLIC BLOOD PRESSURE: 75 MMHG | SYSTOLIC BLOOD PRESSURE: 131 MMHG | WEIGHT: 266 LBS | HEIGHT: 62 IN

## 2025-05-08 DIAGNOSIS — E11.9 TYPE 2 DIABETES MELLITUS WITHOUT COMPLICATION, WITHOUT LONG-TERM CURRENT USE OF INSULIN: ICD-10-CM

## 2025-05-08 DIAGNOSIS — E78.2 MIXED HYPERLIPIDEMIA: ICD-10-CM

## 2025-05-08 DIAGNOSIS — I44.4 LAFB (LEFT ANTERIOR FASCICULAR BLOCK): ICD-10-CM

## 2025-05-08 DIAGNOSIS — I10 ESSENTIAL HYPERTENSION: Primary | ICD-10-CM

## 2025-05-08 DIAGNOSIS — Z82.49 FAMILY HISTORY OF PREMATURE CAD: ICD-10-CM

## 2025-05-08 NOTE — PROGRESS NOTES
CARDIOLOGY INITIAL CONSULT       Chief Complaint  Establish Care, Hypertension, and Hyperlipidemia    Subjective            Evy Talley presents to [facility] CARDIOLOG[facility]  History of Present Illness  The patient is referred for a cardiovascular evaluation due to abnormal EKG.  Her previous EKG shows sinus rhythm with early repolarization as and the latest EKG showed new left anterior fascicular block and some criteria for LVH.  The patient has a history of essential hypertension, mixed hyperlipidemia and type 2 diabetes.  She quit smoking in the 70s.  The patient has strong family history of premature coronary artery disease, father had coronary bypass graft in his mid 50s.  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  She denies angina or dyspnea.       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    Rotator cuff tear        Surgical History: has a past surgical history that includes Appendectomy; Hysterectomy; Other surgical history; Tubal ligation; Oophorectomy; Cholecystectomy; Cystoscopy w/ laser lithotripsy; Carpal tunnel release (Left); Knee Arthroplasty (Left); Shoulder Manipulation; Pilonidal Cystectomy; Shoulder arthroscopy w/ rotator cuff repair (Left, 01/30/2019); Colonoscopy (2014); Upper gastrointestinal endoscopy (2014); Colonoscopy (N/A, 11/13/2019); Esophagogastroduodenoscopy (N/A, 11/13/2019); Total abdominal hysterectomy w/ bilateral salpingoophorectomy (03/1996); and Joint replacement (03/2014).     Family History: family history includes  Aneurysm in her mother; Diabetes in her maternal grandmother; Esophageal cancer in her maternal uncle, maternal uncle, and maternal uncle; Heart attack in her paternal grandfather; Heart disease in her father; Hypertension in her father and mother; Lung cancer in her father; Sjogren's syndrome in her sister; Transient ischemic attack in her mother.     Social History: reports that she quit smoking about 49 years ago. Her smoking use included cigarettes. She started smoking about 53 years ago. She has a 3.2 pack-year smoking history. She has never used smokeless tobacco. She reports that she does not drink alcohol and does not use drugs.    Allergies: Influenza vaccines    Current Outpatient Medications on File Prior to Visit   Medication Sig    aspirin 81 MG tablet Take 1 tablet by mouth Daily. PT TO STOP PER MD INSTRUCTION    atorvastatin (LIPITOR) 20 MG tablet Take 1 tablet by mouth every night at bedtime.    B Complex Vitamins (VITAMIN-B COMPLEX PO) Take 1 tablet by mouth daily.    Biotin (BIOTIN MAXIMUM STRENGTH) 10 MG tablet Take 1 capsule by mouth daily.    Calcium Carb-Cholecalciferol (CALCIUM 600+D3 PO) Take 1 tablet by mouth daily.    cyclobenzaprine (FLEXERIL) 10 MG tablet Take 1 tablet by mouth 2 (Two) Times a Day As Needed for Muscle Spasms.    esomeprazole (nexIUM) 20 MG capsule Take 1 capsule by mouth Every Morning Before Breakfast.    ferrous sulfate 325 (65 FE) MG tablet Take 1 tablet by mouth Daily With Breakfast.    levothyroxine (SYNTHROID, LEVOTHROID) 112 MCG tablet Take 1 tablet by mouth daily.    losartan (COZAAR) 100 MG tablet Take 1 tablet by mouth Daily.    metFORMIN (GLUCOPHAGE) 500 MG tablet Take 1 tablet by mouth 2 (Two) Times a Day With Meals. (Patient taking differently: Take 1 tablet by mouth Daily With Breakfast.)    Multiple Vitamin (MULTI VITAMIN DAILY PO) Take 1 tablet by mouth Daily.    Naproxen Sodium (Aleve) 220 MG capsule 1 tablet 2 (Two) Times a Day.    sertraline (ZOLOFT) 100  "MG tablet Take 1 tablet by mouth Daily. (Patient taking differently: Take 0.25 mg by mouth Daily.)    Tirzepatide 5 MG/0.5ML solution auto-injector Inject 5 mg under the skin into the appropriate area as directed 1 (One) Time Per Week.    Omega-3 Fatty Acids (FISH OIL) 1000 MG capsule capsule Take 1 capsule by mouth Daily With Breakfast. PT TO STOP PER MD INSTRUCTION (Patient not taking: Reported on 5/8/2025)     No current facility-administered medications on file prior to visit.          Review of Systems : All systems were reviewed and negative    Objective     /75 (BP Location: Left arm)   Pulse 73   Ht 157.5 cm (62\")   Wt 121 kg (266 lb)   BMI 48.65 kg/m²       Physical Exam    Alert  Heart. Regular, no gallops, no rubs, SM ++ 2nd RICS.  Lungs: no rales, no wheezing  Abdomen: bs +  LE: no edema  Neurologic. No motor deficits.   Result Review :     The following data was reviewed by: Chetan Olmedo MD on 05/08/2025:    CMP          2/14/2025    08:11 4/7/2025    14:01   CMP   Glucose 122  119    BUN 20  33    Creatinine 1.13  1.09    EGFR 52.1  54.1    Sodium 136  137    Potassium 4.8  3.8    Chloride 101  101    Calcium 10.6  10.3    Total Protein 6.8  7.6    Albumin 4.3  4.3    Globulin 2.5  3.3    Total Bilirubin 0.3  0.2    Alkaline Phosphatase 101  82    AST (SGOT) 28  29    ALT (SGPT) 28  18    Albumin/Globulin Ratio 1.7  1.3    BUN/Creatinine Ratio 17.7  30.3    Anion Gap  12.6      CBC          2/14/2025    08:11 4/7/2025    14:01   CBC   WBC 15.44  15.68    RBC 4.89  4.66    Hemoglobin 13.5  12.9    Hematocrit 41.6  39.7    MCV 85.1  85.2    MCH 27.6  27.7    MCHC 32.5  32.5    RDW 14.7  16.0    Platelets 708  672      TSH          2/14/2025    08:11   TSH   TSH 3.980      Lipid Panel          2/14/2025    08:11   Lipid Panel   Total Cholesterol 218    Triglycerides 166    HDL Cholesterol 49    VLDL Cholesterol 30    LDL Cholesterol  139    LDL/HDL Ratio 2.77         Data reviewed: Cardiology " studies                     Assessment and Plan        Diagnoses and all orders for this visit:    1. Essential hypertension (Primary)  -     Adult Transthoracic Echo Complete W/ Cont if Necessary Per Protocol; Future    2. Family history of premature CAD  -     CT Cardiac Calcium Score Without Dye; Future    3. LAFB (left anterior fascicular block)    4. Mixed hyperlipidemia    5. Type 2 diabetes mellitus without complication, without long-term current use of insulin        The patient's 10-year ASCVD risk for nonfatal and fatal myocardial infarction is 42%.  This is extremely high.  She have major risk factor for coronary artery disease now with abnormal EKG and also had a heart murmur.  Will continue with statin therapy for goal LDL less than 70 mg/dL.  Will continue with blood pressure control, goal less than 130/80 mmHg.  I think it is reasonable to obtain a 2D echocardiogram to assess wall motion and better evaluation of the heart murmur.  Advised to get her coronary calcium score for better risk assessment for future cardiovascular events.  Continue with lifestyle modification and heart healthy diet.  Continue with regular exercise    I spent 45 minutes caring for Evy on this date of service. This time includes time spent by me in the following activities:reviewing tests, obtaining and/or reviewing a separately obtained history, performing a medically appropriate examination and/or evaluation , ordering medications, tests, or procedures, and documenting information in the medical record    Follow Up     Return for LBunch, After testing.    Patient was given instructions and counseling regarding her condition or for health maintenance advice. Please see specific information pulled into the AVS if appropriate.

## 2025-05-12 ENCOUNTER — PATIENT MESSAGE (OUTPATIENT)
Dept: CARDIOLOGY | Facility: CLINIC | Age: 70
End: 2025-05-12
Payer: COMMERCIAL

## 2025-05-14 NOTE — TELEPHONE ENCOUNTER
GAURI patient. Attempted to explain to patient the EKG was clear and was able to be read. This would not be repeated as a practice unless patient was experiencing new symptoms. Patient went on to say that she was not happy and wanted to change providers. Patient is currently seeking new provider in [place].

## 2025-05-19 RX ORDER — TIRZEPATIDE 5 MG/.5ML
INJECTION, SOLUTION SUBCUTANEOUS
Qty: 2 ML | Refills: 0 | Status: SHIPPED | OUTPATIENT
Start: 2025-05-19

## 2025-05-21 ENCOUNTER — OFFICE VISIT (OUTPATIENT)
Dept: INTERNAL MEDICINE | Facility: CLINIC | Age: 70
End: 2025-05-21
Payer: COMMERCIAL

## 2025-05-21 VITALS
WEIGHT: 264.9 LBS | HEIGHT: 62 IN | OXYGEN SATURATION: 98 % | TEMPERATURE: 98.2 F | BODY MASS INDEX: 48.75 KG/M2 | DIASTOLIC BLOOD PRESSURE: 78 MMHG | HEART RATE: 70 BPM | SYSTOLIC BLOOD PRESSURE: 136 MMHG

## 2025-05-21 DIAGNOSIS — Z12.11 COLON CANCER SCREENING: ICD-10-CM

## 2025-05-21 DIAGNOSIS — E03.9 ACQUIRED HYPOTHYROIDISM: ICD-10-CM

## 2025-05-21 DIAGNOSIS — I10 PRIMARY HYPERTENSION: ICD-10-CM

## 2025-05-21 DIAGNOSIS — E78.5 HYPERLIPIDEMIA, UNSPECIFIED HYPERLIPIDEMIA TYPE: ICD-10-CM

## 2025-05-21 DIAGNOSIS — Z00.00 HEALTH CARE MAINTENANCE: Primary | ICD-10-CM

## 2025-05-21 DIAGNOSIS — E11.9 TYPE 2 DIABETES MELLITUS WITHOUT COMPLICATION, WITHOUT LONG-TERM CURRENT USE OF INSULIN: ICD-10-CM

## 2025-05-21 DIAGNOSIS — M19.90 ARTHRITIS: ICD-10-CM

## 2025-05-21 RX ORDER — LOSARTAN POTASSIUM 100 MG/1
100 TABLET ORAL DAILY
Qty: 90 TABLET | Refills: 0 | OUTPATIENT
Start: 2025-05-21

## 2025-05-21 RX ORDER — LOSARTAN POTASSIUM 100 MG/1
100 TABLET ORAL DAILY
Qty: 90 TABLET | Refills: 3 | Status: SHIPPED | OUTPATIENT
Start: 2025-05-21

## 2025-05-21 RX ORDER — SERTRALINE HYDROCHLORIDE 100 MG/1
100 TABLET, FILM COATED ORAL DAILY
Qty: 90 TABLET | Refills: 0 | OUTPATIENT
Start: 2025-05-21

## 2025-05-21 NOTE — PROGRESS NOTES
Subjective   Evy Talley is a 70 y.o. female and is here for a comprehensive physical exam. The patient reports problems - DM .  She is doing better with mounjaro  down 30lbs since she started  Pt has been taking cholesterol meds as prescribed.  No difficulties with myalgias.   Pt has been taking BP meds as prescribed without any problems.  No HA  No episodes of orthostasis  Pt has been compliant with diabetes meds. No side effects from medication No episodes of hypoglycemia. Patient denies any polyuria or polydipsia  Pt has been doing well with thyroid meds.  Taking as perscribed without any complications    Pt is UTD with annual gyn exam and mammo     Do you take any herbs or supplements that were not prescribed by a doctor?       Social History: no tob no etoh  Social History     Socioeconomic History    Marital status: Single    Number of children: 3    Years of education: College   Tobacco Use    Smoking status: Former     Current packs/day: 0.00     Average packs/day: 0.8 packs/day for 4.3 years (3.2 ttl pk-yrs)     Types: Cigarettes     Start date: 1972     Quit date: 1976     Years since quittin.3    Smokeless tobacco: Never   Substance and Sexual Activity    Alcohol use: No    Drug use: No    Sexual activity: Not Currently     Partners: Male     Birth control/protection: Post-menopausal, Tubal ligation, Bilateral salpingectomy , Hysterectomy       Family History:   Family History   Problem Relation Age of Onset    Transient ischemic attack Mother     Aneurysm Mother     Hypertension Mother     Lung cancer Father     Heart disease Father     Hypertension Father     Sjogren's syndrome Sister     Diabetes Maternal Grandmother     Esophageal cancer Maternal Uncle     Esophageal cancer Maternal Uncle     Esophageal cancer Maternal Uncle     Heart attack Paternal Grandfather     Malig Hyperthermia Neg Hx        Past Medical History:   Past Medical History:   Diagnosis Date    Abnormal ECG 2025  "   Cholelithiasis     Diabetes mellitus     Essential hypertension     GERD (gastroesophageal reflux disease)     H/O Epidermal cyst of neck     History of obesity     Hx of renal calculi     Hyperlipemia     Hypothyroidism     Kidney stone 2004    Leukocytosis     Low back pain     Mild depression     Osteoarthritis     Lower leg, localized    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    Objective   /78 (BP Location: Left arm, Patient Position: Sitting)   Pulse 70   Temp 98.2 °F (36.8 °C) (Oral)   Ht 157.5 cm (62.01\")   Wt 120 kg (264 lb 14.4 oz)   SpO2 98%   BMI 48.44 kg/m²     General Appearance:    Alert, cooperative, no distress, appears stated age   Head:    Normocephalic, without obvious abnormality, atraumatic   Eyes:    PERRL, conjunctiva/corneas clear, EOM's intact, fundi     benign, both eyes   Ears:    Normal TM's and external ear canals, both ears   Nose:   Nares normal, septum midline, mucosa normal, no drainage    or sinus tenderness   Throat:   Lips, mucosa, and tongue normal; teeth and gums normal   Neck:   Supple, symmetrical, trachea midline, no adenopathy;     thyroid:  no enlargement/tenderness/nodules; no carotid    bruit or JVD   Back:     Symmetric, no curvature, ROM normal, no CVA tenderness   Lungs:     Clear to auscultation bilaterally, respirations unlabored   Chest Wall:    No tenderness or deformity    Heart:    Regular rate and rhythm, S1 and S2 normal, no murmur, rub   or gallop       Abdomen:     Soft, non-tender, bowel sounds active all four quadrants,     no masses, no organomegaly           Extremities:   Extremities normal, atraumatic, no cyanosis or edema   Pulses:   2+ and symmetric all extremities   Skin:   Skin color, texture, turgor normal, no rashes or lesions   Lymph nodes:   Cervical, supraclavicular, and axillary nodes normal   Neurologic:   CNII-XII intact, normal strength, " sensation and reflexes     throughout       Vitals:    05/21/25 0833   BP: 136/78   Pulse: 70   Temp: 98.2 °F (36.8 °C)   SpO2: 98%     Body mass index is 48.44 kg/m².      Medications:   Current Outpatient Medications:     aspirin 81 MG tablet, Take 1 tablet by mouth Daily. PT TO STOP PER MD INSTRUCTION, Disp: , Rfl:     atorvastatin (LIPITOR) 20 MG tablet, Take 1 tablet by mouth every night at bedtime., Disp: 90 tablet, Rfl: 0    B Complex Vitamins (VITAMIN-B COMPLEX PO), Take 1 tablet by mouth daily., Disp: , Rfl:     Biotin (BIOTIN MAXIMUM STRENGTH) 10 MG tablet, Take 1 capsule by mouth daily., Disp: , Rfl:     Calcium Carb-Cholecalciferol (CALCIUM 600+D3 PO), Take 1 tablet by mouth daily., Disp: , Rfl:     cyclobenzaprine (FLEXERIL) 10 MG tablet, Take 1 tablet by mouth 2 (Two) Times a Day As Needed for Muscle Spasms., Disp: 30 tablet, Rfl: 2    esomeprazole (nexIUM) 20 MG capsule, Take 1 capsule by mouth Every Morning Before Breakfast., Disp: , Rfl:     ferrous sulfate 325 (65 FE) MG tablet, Take 1 tablet by mouth Daily With Breakfast., Disp: 60 tablet, Rfl: 1    levothyroxine (SYNTHROID, LEVOTHROID) 112 MCG tablet, Take 1 tablet by mouth daily., Disp: 90 tablet, Rfl: 0    losartan (COZAAR) 100 MG tablet, Take 1 tablet by mouth Daily., Disp: 90 tablet, Rfl: 0    metFORMIN (GLUCOPHAGE) 500 MG tablet, Take 1 tablet by mouth twice daily with meals., Disp: 180 tablet, Rfl: 0    Mounjaro 5 MG/0.5ML solution auto-injector, Inject 5 mg under the skin into the appropriate area as directed once weekly., Disp: 2 mL, Rfl: 0    Multiple Vitamin (MULTI VITAMIN DAILY PO), Take 1 tablet by mouth Daily., Disp: , Rfl:     Naproxen Sodium (Aleve) 220 MG capsule, 1 tablet 2 (Two) Times a Day. (Patient taking differently: 1 tablet Daily As Needed.), Disp: , Rfl:     Omega-3 Fatty Acids (FISH OIL) 1000 MG capsule capsule, Take 1 capsule by mouth Daily With Breakfast. PT TO STOP PER MD INSTRUCTION, Disp: , Rfl:     sertraline  (ZOLOFT) 100 MG tablet, Take 1 tablet by mouth Daily. (Patient taking differently: Take 25 mg by mouth Daily.), Disp: 90 tablet, Rfl: 0    Class 3 Severe Obesity (BMI >=40). Obesity-related health conditions include the following: hypertension and GERD. Obesity is unchanged. BMI is is above average; BMI management plan is completed. We discussed portion control and increasing exercise.        Assessment & Plan   Healthy female exam.      1. Healthcare Maintenance:  2. Patient Counseling:  --Nutrition: Stressed importance of moderation in sodium/caffeine intake, saturated fat and cholesterol, caloric balance, sufficient intake of fresh fruits, vegetables, fiber, calcium and vit D  --Exercise: she does exercise reg  --Substance Abuse: no tob no etoh  --Dental health: she does go to the dentist reg  --Immunizations reviewed.  --Discussed benefits of screening colonoscopy.  3.  HTN-  cont the losartan 100mg a day  4.  Hypothyroidism-  ok with current meds  5.  DM-  ok  with mounjaro 5mg for now  she will call back about increasing the dose  will work on diet and exercise   6.  Leukocytosis-  she wants to see if stopping the zoloft will help   7.  Arthritis in the hands-  we will check arthritis ;labs  She has been toild she may have RA

## 2025-05-22 LAB
ANA SER QL: NEGATIVE
BASOPHILS # BLD AUTO: 0.2 10*3/MM3 (ref 0–0.2)
BASOPHILS NFR BLD AUTO: 1.3 % (ref 0–1.5)
CRP SERPL-MCNC: 0.49 MG/DL (ref 0–0.5)
EOSINOPHIL # BLD AUTO: 0.44 10*3/MM3 (ref 0–0.4)
EOSINOPHIL NFR BLD AUTO: 2.9 % (ref 0.3–6.2)
ERYTHROCYTE [DISTWIDTH] IN BLOOD BY AUTOMATED COUNT: 14.7 % (ref 12.3–15.4)
HCT VFR BLD AUTO: 43.6 % (ref 34–46.6)
HGB BLD-MCNC: 13.6 G/DL (ref 12–15.9)
IMM GRANULOCYTES # BLD AUTO: 0.07 10*3/MM3 (ref 0–0.05)
IMM GRANULOCYTES NFR BLD AUTO: 0.5 % (ref 0–0.5)
LYMPHOCYTES # BLD AUTO: 2.92 10*3/MM3 (ref 0.7–3.1)
LYMPHOCYTES NFR BLD AUTO: 19.3 % (ref 19.6–45.3)
MCH RBC QN AUTO: 27.6 PG (ref 26.6–33)
MCHC RBC AUTO-ENTMCNC: 31.2 G/DL (ref 31.5–35.7)
MCV RBC AUTO: 88.6 FL (ref 79–97)
MONOCYTES # BLD AUTO: 0.86 10*3/MM3 (ref 0.1–0.9)
MONOCYTES NFR BLD AUTO: 5.7 % (ref 5–12)
NEUTROPHILS # BLD AUTO: 10.64 10*3/MM3 (ref 1.7–7)
NEUTROPHILS NFR BLD AUTO: 70.3 % (ref 42.7–76)
NRBC BLD AUTO-RTO: 0 /100 WBC (ref 0–0.2)
PLATELET # BLD AUTO: 716 10*3/MM3 (ref 140–450)
RBC # BLD AUTO: 4.92 10*6/MM3 (ref 3.77–5.28)
RHEUMATOID FACT SERPL-ACNC: <10 IU/ML
WBC # BLD AUTO: 15.13 10*3/MM3 (ref 3.4–10.8)

## 2025-06-02 RX ORDER — LEVOTHYROXINE SODIUM 112 UG/1
112 TABLET ORAL DAILY
Qty: 90 TABLET | Refills: 1 | Status: SHIPPED | OUTPATIENT
Start: 2025-06-02

## 2025-06-04 ENCOUNTER — TELEPHONE (OUTPATIENT)
Dept: INTERNAL MEDICINE | Facility: CLINIC | Age: 70
End: 2025-06-04
Payer: COMMERCIAL

## 2025-07-07 RX ORDER — TIRZEPATIDE 7.5 MG/.5ML
INJECTION, SOLUTION SUBCUTANEOUS
Qty: 2 ML | Refills: 1 | Status: SHIPPED | OUTPATIENT
Start: 2025-07-07

## 2025-07-29 ENCOUNTER — TRANSCRIBE ORDERS (OUTPATIENT)
Dept: ADMINISTRATIVE | Facility: HOSPITAL | Age: 70
End: 2025-07-29
Payer: COMMERCIAL

## 2025-07-29 DIAGNOSIS — Z12.31 SCREENING MAMMOGRAM, ENCOUNTER FOR: Primary | ICD-10-CM

## 2025-08-25 DIAGNOSIS — E11.9 TYPE 2 DIABETES MELLITUS WITHOUT COMPLICATION, WITHOUT LONG-TERM CURRENT USE OF INSULIN: Primary | ICD-10-CM

## 2025-08-27 ENCOUNTER — OFFICE VISIT (OUTPATIENT)
Dept: INTERNAL MEDICINE | Facility: CLINIC | Age: 70
End: 2025-08-27
Payer: COMMERCIAL

## 2025-08-27 VITALS
HEIGHT: 62 IN | OXYGEN SATURATION: 95 % | HEART RATE: 71 BPM | DIASTOLIC BLOOD PRESSURE: 74 MMHG | SYSTOLIC BLOOD PRESSURE: 132 MMHG | WEIGHT: 235 LBS | BODY MASS INDEX: 43.24 KG/M2

## 2025-08-27 DIAGNOSIS — I10 PRIMARY HYPERTENSION: Primary | ICD-10-CM

## 2025-08-27 DIAGNOSIS — E78.5 HYPERLIPIDEMIA, UNSPECIFIED HYPERLIPIDEMIA TYPE: ICD-10-CM

## 2025-08-27 DIAGNOSIS — E66.01 MORBID (SEVERE) OBESITY DUE TO EXCESS CALORIES: ICD-10-CM

## 2025-08-27 DIAGNOSIS — E11.9 TYPE 2 DIABETES MELLITUS WITHOUT COMPLICATION, WITHOUT LONG-TERM CURRENT USE OF INSULIN: ICD-10-CM

## (undated) DEVICE — THE TORRENT IRRIGATION SCOPE CONNECTOR IS USED WITH THE TORRENT IRRIGATION TUBING TO PROVIDE IRRIGATION FLUIDS SUCH AS STERILE WATER DURING GASTROINTESTINAL ENDOSCOPIC PROCEDURES WHEN USED IN CONJUNCTION WITH AN IRRIGATION PUMP (OR ELECTROSURGICAL UNIT).: Brand: TORRENT

## (undated) DEVICE — DRSNG GZ PETROLTM XEROFORM CURAD 1X8IN STRL

## (undated) DEVICE — THE SINGLE USE ETRAP – POLYP TRAP IS USED FOR SUCTION RETRIEVAL OF ENDOSCOPICALLY REMOVED POLYPS.: Brand: ETRAP

## (undated) DEVICE — FRCP BX RADJAW4 NDL 2.8 240CM LG OG BX40

## (undated) DEVICE — TBG ARTHSCP PT W CONN/REDUC 8FT

## (undated) DEVICE — TUBING, SUCTION, 1/4" X 10', STRAIGHT: Brand: MEDLINE

## (undated) DEVICE — KT POSTN ARM TRIMANO BEACH CHR W/DRP

## (undated) DEVICE — PK ARTHSCP SHLDR TOWER 40

## (undated) DEVICE — SENSR O2 OXIMAX FNGR A/ 18IN NONSTR

## (undated) DEVICE — SUT PROLN 4/0 FS2 18IN 8683G

## (undated) DEVICE — GLV SURG BIOGEL LTX PF 8

## (undated) DEVICE — GLV SURG BIOGEL LTX PF 8 1/2

## (undated) DEVICE — 5.5 MM ACROMIONIZER STRAIGHT                                    BURRS, POWER/EP-1, BROWN, 8000                                    MAXIMUM RPM, PACKAGED 6 PER BOX, STERILE

## (undated) DEVICE — CANN NASL CO2 TRULINK W/O2 A/

## (undated) DEVICE — ARM SLING: Brand: DEROYAL

## (undated) DEVICE — SNAR POLYP SENSATION STDOVL 27 240 BX40

## (undated) DEVICE — VULCAN GENERATOR ADAPTER,PACKAGED                                    3 PER BOX, STERILE. REQUIRED TO                                    OPERATE 7205962, PACKAGED                                    NON-STERILE, REUSABLE

## (undated) DEVICE — ADAPT DB SPIKE 2PCT FOR AR6400 TBG

## (undated) DEVICE — SKIN PREP TRAY W/CHG: Brand: MEDLINE INDUSTRIES, INC.

## (undated) DEVICE — KT VLV BIOGUARD SXN BIOP AIR/H20 CONN 4PC DISP

## (undated) DEVICE — BITEBLOCK OMNI BLOC